# Patient Record
Sex: FEMALE | Race: WHITE | NOT HISPANIC OR LATINO | Employment: OTHER | URBAN - METROPOLITAN AREA
[De-identification: names, ages, dates, MRNs, and addresses within clinical notes are randomized per-mention and may not be internally consistent; named-entity substitution may affect disease eponyms.]

---

## 2023-02-22 LAB
ALANINE AMINOTRANSFERASE (SGPT) (U/L) IN SER/PLAS: 11 U/L (ref 7–45)
ALBUMIN (G/DL) IN SER/PLAS: 4.5 G/DL (ref 3.4–5)
ALKALINE PHOSPHATASE (U/L) IN SER/PLAS: 63 U/L (ref 33–136)
ANION GAP IN SER/PLAS: 11 MMOL/L (ref 10–20)
ASPARTATE AMINOTRANSFERASE (SGOT) (U/L) IN SER/PLAS: 11 U/L (ref 9–39)
BASOPHILS (10*3/UL) IN BLOOD BY AUTOMATED COUNT: 0.09 X10E9/L (ref 0–0.1)
BASOPHILS/100 LEUKOCYTES IN BLOOD BY AUTOMATED COUNT: 1.5 % (ref 0–2)
BILIRUBIN TOTAL (MG/DL) IN SER/PLAS: 0.5 MG/DL (ref 0–1.2)
CALCIDIOL (25 OH VITAMIN D3) (NG/ML) IN SER/PLAS: 24 NG/ML
CALCIUM (MG/DL) IN SER/PLAS: 9.6 MG/DL (ref 8.6–10.6)
CARBON DIOXIDE, TOTAL (MMOL/L) IN SER/PLAS: 32 MMOL/L (ref 21–32)
CHLORIDE (MMOL/L) IN SER/PLAS: 104 MMOL/L (ref 98–107)
CHOLESTEROL (MG/DL) IN SER/PLAS: 231 MG/DL (ref 0–199)
CHOLESTEROL IN HDL (MG/DL) IN SER/PLAS: 89.4 MG/DL
CHOLESTEROL/HDL RATIO: 2.6
CREATININE (MG/DL) IN SER/PLAS: 0.7 MG/DL (ref 0.5–1.05)
EOSINOPHILS (10*3/UL) IN BLOOD BY AUTOMATED COUNT: 0.43 X10E9/L (ref 0–0.7)
EOSINOPHILS/100 LEUKOCYTES IN BLOOD BY AUTOMATED COUNT: 6.9 % (ref 0–6)
ERYTHROCYTE DISTRIBUTION WIDTH (RATIO) BY AUTOMATED COUNT: 13.6 % (ref 11.5–14.5)
ERYTHROCYTE MEAN CORPUSCULAR HEMOGLOBIN CONCENTRATION (G/DL) BY AUTOMATED: 31.8 G/DL (ref 32–36)
ERYTHROCYTE MEAN CORPUSCULAR VOLUME (FL) BY AUTOMATED COUNT: 94 FL (ref 80–100)
ERYTHROCYTES (10*6/UL) IN BLOOD BY AUTOMATED COUNT: 4.64 X10E12/L (ref 4–5.2)
ESTIMATED AVERAGE GLUCOSE FOR HBA1C: 108 MG/DL
GFR FEMALE: >90 ML/MIN/1.73M2
GLUCOSE (MG/DL) IN SER/PLAS: 90 MG/DL (ref 74–99)
HEMATOCRIT (%) IN BLOOD BY AUTOMATED COUNT: 43.4 % (ref 36–46)
HEMOGLOBIN (G/DL) IN BLOOD: 13.8 G/DL (ref 12–16)
HEMOGLOBIN A1C/HEMOGLOBIN TOTAL IN BLOOD: 5.4 %
IMMATURE GRANULOCYTES/100 LEUKOCYTES IN BLOOD BY AUTOMATED COUNT: 0.5 % (ref 0–0.9)
LDL: 126 MG/DL (ref 0–99)
LEUKOCYTES (10*3/UL) IN BLOOD BY AUTOMATED COUNT: 6.2 X10E9/L (ref 4.4–11.3)
LYMPHOCYTES (10*3/UL) IN BLOOD BY AUTOMATED COUNT: 2.44 X10E9/L (ref 1.2–4.8)
LYMPHOCYTES/100 LEUKOCYTES IN BLOOD BY AUTOMATED COUNT: 39.4 % (ref 13–44)
MONOCYTES (10*3/UL) IN BLOOD BY AUTOMATED COUNT: 0.62 X10E9/L (ref 0.1–1)
MONOCYTES/100 LEUKOCYTES IN BLOOD BY AUTOMATED COUNT: 10 % (ref 2–10)
NEUTROPHILS (10*3/UL) IN BLOOD BY AUTOMATED COUNT: 2.58 X10E9/L (ref 1.2–7.7)
NEUTROPHILS/100 LEUKOCYTES IN BLOOD BY AUTOMATED COUNT: 41.7 % (ref 40–80)
NRBC (PER 100 WBCS) BY AUTOMATED COUNT: 0 /100 WBC (ref 0–0)
PLATELETS (10*3/UL) IN BLOOD AUTOMATED COUNT: 293 X10E9/L (ref 150–450)
POTASSIUM (MMOL/L) IN SER/PLAS: 4.3 MMOL/L (ref 3.5–5.3)
PROTEIN TOTAL: 6.8 G/DL (ref 6.4–8.2)
SODIUM (MMOL/L) IN SER/PLAS: 143 MMOL/L (ref 136–145)
THYROTROPIN (MIU/L) IN SER/PLAS BY DETECTION LIMIT <= 0.05 MIU/L: 2.64 MIU/L (ref 0.44–3.98)
TRIGLYCERIDE (MG/DL) IN SER/PLAS: 77 MG/DL (ref 0–149)
UREA NITROGEN (MG/DL) IN SER/PLAS: 18 MG/DL (ref 6–23)
VLDL: 15 MG/DL (ref 0–40)

## 2023-06-06 DIAGNOSIS — I10 ESSENTIAL (PRIMARY) HYPERTENSION: ICD-10-CM

## 2023-06-06 RX ORDER — LOSARTAN POTASSIUM AND HYDROCHLOROTHIAZIDE 12.5; 5 MG/1; MG/1
TABLET ORAL
Qty: 90 TABLET | Refills: 1 | Status: SHIPPED | OUTPATIENT
Start: 2023-06-06 | End: 2023-08-23 | Stop reason: SDUPTHER

## 2023-08-23 ENCOUNTER — OFFICE VISIT (OUTPATIENT)
Dept: PRIMARY CARE | Facility: CLINIC | Age: 68
End: 2023-08-23
Payer: MEDICARE

## 2023-08-23 ENCOUNTER — LAB (OUTPATIENT)
Dept: LAB | Facility: LAB | Age: 68
End: 2023-08-23
Payer: MEDICARE

## 2023-08-23 VITALS
OXYGEN SATURATION: 98 % | BODY MASS INDEX: 29.35 KG/M2 | DIASTOLIC BLOOD PRESSURE: 83 MMHG | HEART RATE: 73 BPM | WEIGHT: 171 LBS | SYSTOLIC BLOOD PRESSURE: 130 MMHG

## 2023-08-23 DIAGNOSIS — M25.561 CHRONIC PAIN OF RIGHT KNEE: ICD-10-CM

## 2023-08-23 DIAGNOSIS — E78.2 MIXED HYPERLIPIDEMIA: ICD-10-CM

## 2023-08-23 DIAGNOSIS — I10 ESSENTIAL (PRIMARY) HYPERTENSION: Primary | ICD-10-CM

## 2023-08-23 DIAGNOSIS — E55.9 VITAMIN D DEFICIENCY: ICD-10-CM

## 2023-08-23 DIAGNOSIS — Z12.31 ENCOUNTER FOR SCREENING MAMMOGRAM FOR BREAST CANCER: ICD-10-CM

## 2023-08-23 DIAGNOSIS — F41.1 GENERALIZED ANXIETY DISORDER: ICD-10-CM

## 2023-08-23 DIAGNOSIS — G89.29 CHRONIC PAIN OF RIGHT KNEE: ICD-10-CM

## 2023-08-23 DIAGNOSIS — K21.9 GASTROESOPHAGEAL REFLUX DISEASE, UNSPECIFIED WHETHER ESOPHAGITIS PRESENT: ICD-10-CM

## 2023-08-23 PROBLEM — E66.01 MORBID OBESITY (MULTI): Status: RESOLVED | Noted: 2023-08-23 | Resolved: 2023-08-23

## 2023-08-23 PROBLEM — F51.04 CHRONIC INSOMNIA: Status: ACTIVE | Noted: 2023-08-23

## 2023-08-23 PROBLEM — E66.01 MORBID OBESITY (MULTI): Status: ACTIVE | Noted: 2023-08-23

## 2023-08-23 PROBLEM — M17.12 PRIMARY OSTEOARTHRITIS OF LEFT KNEE: Status: ACTIVE | Noted: 2023-08-23

## 2023-08-23 PROBLEM — Z98.890 HISTORY OF NECK SURGERY: Status: ACTIVE | Noted: 2020-09-24

## 2023-08-23 PROBLEM — M25.562 LEFT KNEE PAIN: Status: ACTIVE | Noted: 2023-08-23

## 2023-08-23 PROBLEM — Z96.642 STATUS POST LEFT HIP REPLACEMENT: Status: ACTIVE | Noted: 2020-11-02

## 2023-08-23 PROBLEM — E78.5 HYPERLIPIDEMIA: Status: ACTIVE | Noted: 2023-08-23

## 2023-08-23 PROBLEM — R79.9 ABNORMAL BLOOD CHEMISTRY: Status: ACTIVE | Noted: 2023-08-23

## 2023-08-23 PROBLEM — M17.11 ARTHRITIS OF KNEE, RIGHT: Status: ACTIVE | Noted: 2023-08-23

## 2023-08-23 PROBLEM — F41.9 ANXIETY DISORDER: Status: ACTIVE | Noted: 2023-08-23

## 2023-08-23 PROBLEM — R53.83 FATIGUE: Status: ACTIVE | Noted: 2023-08-23

## 2023-08-23 PROBLEM — G47.33 OSA ON CPAP: Status: ACTIVE | Noted: 2023-08-23

## 2023-08-23 PROBLEM — G47.00 INSOMNIA: Status: ACTIVE | Noted: 2023-08-23

## 2023-08-23 LAB
CHOLESTEROL (MG/DL) IN SER/PLAS: 204 MG/DL (ref 0–199)
CHOLESTEROL IN HDL (MG/DL) IN SER/PLAS: 79.3 MG/DL
CHOLESTEROL/HDL RATIO: 2.6
LDL: 109 MG/DL (ref 0–99)
TRIGLYCERIDE (MG/DL) IN SER/PLAS: 77 MG/DL (ref 0–149)
VLDL: 15 MG/DL (ref 0–40)

## 2023-08-23 PROCEDURE — 1159F MED LIST DOCD IN RCRD: CPT | Performed by: PHYSICIAN ASSISTANT

## 2023-08-23 PROCEDURE — 1036F TOBACCO NON-USER: CPT | Performed by: PHYSICIAN ASSISTANT

## 2023-08-23 PROCEDURE — 1126F AMNT PAIN NOTED NONE PRSNT: CPT | Performed by: PHYSICIAN ASSISTANT

## 2023-08-23 PROCEDURE — 99213 OFFICE O/P EST LOW 20 MIN: CPT | Performed by: PHYSICIAN ASSISTANT

## 2023-08-23 PROCEDURE — 1160F RVW MEDS BY RX/DR IN RCRD: CPT | Performed by: PHYSICIAN ASSISTANT

## 2023-08-23 PROCEDURE — 3079F DIAST BP 80-89 MM HG: CPT | Performed by: PHYSICIAN ASSISTANT

## 2023-08-23 PROCEDURE — 36415 COLL VENOUS BLD VENIPUNCTURE: CPT

## 2023-08-23 PROCEDURE — 80061 LIPID PANEL: CPT

## 2023-08-23 PROCEDURE — 3075F SYST BP GE 130 - 139MM HG: CPT | Performed by: PHYSICIAN ASSISTANT

## 2023-08-23 RX ORDER — SIMVASTATIN 20 MG/1
1 TABLET, FILM COATED ORAL DAILY
COMMUNITY
Start: 2013-01-19 | End: 2023-08-23 | Stop reason: SDUPTHER

## 2023-08-23 RX ORDER — CALC/MAG/B COMPLEX/D3/HERB 61
15 TABLET ORAL
COMMUNITY
Start: 2020-11-02 | End: 2023-08-23 | Stop reason: ALTCHOICE

## 2023-08-23 RX ORDER — SIMVASTATIN 20 MG/1
20 TABLET, FILM COATED ORAL DAILY
Qty: 90 TABLET | Refills: 3 | Status: SHIPPED | OUTPATIENT
Start: 2023-08-23

## 2023-08-23 RX ORDER — LOSARTAN POTASSIUM AND HYDROCHLOROTHIAZIDE 12.5; 5 MG/1; MG/1
1 TABLET ORAL DAILY
Qty: 90 TABLET | Refills: 1 | Status: SHIPPED | OUTPATIENT
Start: 2023-08-23 | End: 2024-02-14 | Stop reason: SDUPTHER

## 2023-08-23 RX ORDER — METHYLPREDNISOLONE 4 MG/1
4 TABLET ORAL
COMMUNITY
Start: 2023-02-13 | End: 2023-08-23 | Stop reason: ALTCHOICE

## 2023-08-23 RX ORDER — CITALOPRAM 20 MG/1
1 TABLET, FILM COATED ORAL DAILY
COMMUNITY
Start: 2013-01-19 | End: 2023-08-23 | Stop reason: SDUPTHER

## 2023-08-23 RX ORDER — CALC/MAG/B COMPLEX/D3/HERB 61
15 TABLET ORAL
Qty: 90 CAPSULE | Refills: 3 | Status: CANCELLED | OUTPATIENT
Start: 2023-08-23

## 2023-08-23 RX ORDER — CITALOPRAM 20 MG/1
20 TABLET, FILM COATED ORAL DAILY
Qty: 90 TABLET | Refills: 3 | Status: SHIPPED | OUTPATIENT
Start: 2023-08-23

## 2023-08-23 NOTE — RESULT ENCOUNTER NOTE
Lab results are back showing mild improvement of total cholesterol and LDL.  Levels are still above goal however. I recommend switching statin medication from simvastatin to rosuvastatin.  Please let me know if she is willing and I will call it into the pharmacy

## 2023-08-23 NOTE — ASSESSMENT & PLAN NOTE
Continue simvastatin 20 mg.  Lipid panel ordered.  Follow Mediterranean-style diet and exercise as tolerated.  Total cholesterol and LDL are elevated again, commend switching to moderate intensity statin like atorvastatin 20mg

## 2023-08-23 NOTE — ASSESSMENT & PLAN NOTE
Well controlled. Continue losartan-hydrochlorothiazide 50-12.5mg. Follow a strict DASH diet and exercise as tolerated.

## 2023-08-23 NOTE — PROGRESS NOTES
Subjective   Emmanuel Israel is a 68 y.o. female who presents for follow up,.  HPI Emmanuel Israel is a 68 y.o. female presenting for a follow up. Generally doing well. No new complaints.     HTN, HLD: stable on current regimen.  Denies any headache, dizziness, chest pain, SOB/ELKINS, palpitations, LE edema.    Anxiety: compliant with citalopram 20mg. No thoughts of self/other harm or SI.     BRIANNE: compliant with CPAP use. States usually removes mask half way through the night d/t the machine or mask making loud noises. States she changes the pads often.     Right knee OA: s/p durolane INJ with orthopedics 3/20/23, she is pleased. Due to see Ortho in September. Does not have to take OTC pain meds/creams or apply ice.     Esophageal reflux: discontinued lansoprazole. Using apple cider vinegar gummies    Vitamin D deficiency: taking 5000IU daily     Health maintenance:  Immunizations:  -Flu: deferred to fall 2023  -Pneumococcal: UTD  -Shingrix: received Zostavax, obtain Shingrix from local pharmacy   -Tdap: recommended every 10 years, obtain from local pharmacy  Mammogram UTD (last 1/9/23)-ordered next 8/23/2023  Colon cancer screening UTD (last 3/22/17) - 10 yrs  DEXA UTD (last 2021) - normal   CT cardiac scoring UTD (3/2022)    Last MCR: 2/22/23 (plain MCR)    12 point ROS reviewed and negative other than as stated in HPI    /83   Pulse 73   Wt 77.6 kg (171 lb)   SpO2 98%   BMI 29.35 kg/m²   Objective   Physical Exam  Vitals reviewed.   Constitutional:       General: She is not in acute distress.     Appearance: Normal appearance. She is not ill-appearing.   HENT:      Head: Normocephalic and atraumatic.   Eyes:      General: No scleral icterus.     Extraocular Movements: Extraocular movements intact.      Conjunctiva/sclera: Conjunctivae normal.      Pupils: Pupils are equal, round, and reactive to light.   Cardiovascular:      Rate and Rhythm: Normal rate and regular rhythm.      Heart sounds: Normal  heart sounds. No murmur heard.     No friction rub. No gallop.   Pulmonary:      Effort: Pulmonary effort is normal. No respiratory distress.      Breath sounds: Normal breath sounds. No stridor. No wheezing, rhonchi or rales.   Musculoskeletal:      Cervical back: Normal range of motion.      Right lower leg: No edema.      Left lower leg: No edema.   Skin:     General: Skin is warm and dry.   Neurological:      Mental Status: She is alert and oriented to person, place, and time. Mental status is at baseline.      Cranial Nerves: No cranial nerve deficit.      Gait: Gait normal.   Psychiatric:         Mood and Affect: Mood normal.         Behavior: Behavior normal.         Assessment/Plan   Problem List Items Addressed This Visit       Acid reflux     Stable. Discontinued Prevacid. Using apple cider vinegar gummies, which have been helping.          Anxiety disorder     Stable. Continue citalopram 20mg. Report any thoughts of self/other harm or SI         Relevant Medications    citalopram (CeleXA) 20 mg tablet    Hyperlipidemia     Continue simvastatin 20 mg.  Lipid panel ordered.  Follow Mediterranean-style diet and exercise as tolerated.  Total cholesterol and LDL are elevated again, commend switching to moderate intensity statin like atorvastatin 20mg         Relevant Medications    simvastatin (Zocor) 20 mg tablet    Other Relevant Orders    Lipid panel    Right knee pain     S/p durolene gel injection 3/20/23 with Dr. Pacheco. Due for Ortho appt 9/2023, will schedule          Vitamin D deficiency     Continue vitamin D3 5000IU daily with food.          Essential (primary) hypertension - Primary     Well controlled. Continue losartan-hydrochlorothiazide 50-12.5mg. Follow a strict DASH diet and exercise as tolerated.          Relevant Medications    losartan-hydrochlorothiazide (Hyzaar) 50-12.5 mg tablet     Other Visit Diagnoses       Encounter for screening mammogram for breast cancer        Relevant Orders     BI mammo bilateral screening tomosynthesis           Follow up in 6 months for CPE or sooner as needed

## 2023-08-30 ENCOUNTER — DOCUMENTATION (OUTPATIENT)
Dept: PRIMARY CARE | Facility: CLINIC | Age: 68
End: 2023-08-30
Payer: MEDICARE

## 2023-08-30 DIAGNOSIS — F41.1 GENERALIZED ANXIETY DISORDER: ICD-10-CM

## 2023-08-30 DIAGNOSIS — I10 ESSENTIAL (PRIMARY) HYPERTENSION: ICD-10-CM

## 2023-08-30 DIAGNOSIS — E78.2 MIXED HYPERLIPIDEMIA: ICD-10-CM

## 2023-08-30 NOTE — PROGRESS NOTES
First attempt made to reach Ms. Israel at her listed contact number.  LVM w/ my name and direct contact number.  Will continue to try to reach pt to introduce myself and CCM.  Aline Franklin RN

## 2023-08-31 NOTE — PROGRESS NOTES
Discussion w/ Danita Head, PA    Danita agrees w/ this RN recommendation to patient:  there are many factors to consider such as family hx, getting enough exercise, avoiding fatty meats high in cholesterol, another medication may be more helpful w/ improving cholesterol if she is following a strict diet and getting plenty of exercise as instructed.     Spoke w/ Ms. Israel   Pt identified by name and     Informed Ms. Israel of Danita's recommendation and informed her that Danita wants her to know if she decides to switch her cholesterol medication to let us know and she will call in to her pharmacy.    Ms. Israel verbalizes understanding and will continue her current regimen, she has no other questions at this time.    Aline Franklin, RN

## 2023-09-28 ENCOUNTER — PATIENT OUTREACH (OUTPATIENT)
Dept: PRIMARY CARE | Facility: CLINIC | Age: 68
End: 2023-09-28
Payer: MEDICARE

## 2023-09-28 DIAGNOSIS — I10 ESSENTIAL (PRIMARY) HYPERTENSION: ICD-10-CM

## 2023-09-28 DIAGNOSIS — F41.1 GENERALIZED ANXIETY DISORDER: ICD-10-CM

## 2023-09-28 DIAGNOSIS — E78.2 MIXED HYPERLIPIDEMIA: ICD-10-CM

## 2023-09-28 PROCEDURE — 99490 CHRNC CARE MGMT STAFF 1ST 20: CPT | Performed by: PHYSICIAN ASSISTANT

## 2023-09-28 NOTE — PROGRESS NOTES
"Spoke w/ Ms. Israel  Pt identified by name and     Treatment Goals     LOV: 23, /83, HR 73  A1C on 23 was 5.4  NOV: 24     For high blood pressure:   Treatment goals include:  [place]/continue prudent diet and regular exercise.   Blood Pressure Treatment goals include:   BP of 120/80, with no higher than 140/85 on consistent basis.   Exercise at least 3-4 days a week for at least 30 minutes  Eat a balanced diet, rich in fruits and vegetables, low in sodium and processed foods.  If you are overweight, work toward a goal BMI of less than 25, with short-term goal of 10 pound weight loss.     For Cholesterol:   Treatment goals include:  HIGHER IN FRUITS AND VEGGIES AND WHOLE GRAIN AND LOWER IN FATTY FOODS     Think about those things which may be affecting your ability to reach those goals, and develop a plan to overcome them.     Additional resources are available upon request to help you attain goals, including dietitian.     Ms. Israel informed me that \"she is doing well, she is following her care plan, taking all of her medications as instructed, no RF needed, no questions or concerns at this time.\"    Thanked Ms. Israel for her time to speak w/ me today and informed her that, I am happy to assist if she has any questions or concerns regarding her healthcare needs.    Pt verbalizes understanding and agrees w/ plan of care.    Aline Franklin RN    "

## 2023-10-31 ENCOUNTER — PATIENT OUTREACH (OUTPATIENT)
Dept: PRIMARY CARE | Facility: CLINIC | Age: 68
End: 2023-10-31
Payer: MEDICARE

## 2023-10-31 DIAGNOSIS — F41.1 GENERALIZED ANXIETY DISORDER: ICD-10-CM

## 2023-10-31 DIAGNOSIS — E78.2 MIXED HYPERLIPIDEMIA: ICD-10-CM

## 2023-10-31 DIAGNOSIS — I10 ESSENTIAL (PRIMARY) HYPERTENSION: ICD-10-CM

## 2023-10-31 NOTE — PROGRESS NOTES
LOV: 8/23/23, /83, HR 73  A1C on 2/22/23 was 5.4  NOV: 2/14/24    Treatment Goals     For high blood pressure:   Treatment goals include:  [place]/continue prudent diet and regular exercise.   Blood Pressure Treatment goals include:   BP of 120/80, with no higher than 140/85 on consistent basis.   Exercise at least 3-4 days a week for at least 30 minutes  Eat a balanced diet, rich in fruits and vegetables, low in sodium and processed foods.  If you are overweight, work toward a goal BMI of less than 25, with short-term goal of 10 pound weight loss.     For Cholesterol:   Treatment goals include:  HIGHER IN FRUITS AND VEGGIES AND WHOLE GRAIN AND LOWER IN FATTY FOODS     Think about those things which may be affecting your ability to reach those goals, and develop a plan to overcome them.     Additional resources are available upon request to help you attain goals, including dietitian.    Aline Franklin RN

## 2023-11-09 PROBLEM — L82.1 OTHER SEBORRHEIC KERATOSIS: Status: ACTIVE | Noted: 2019-12-04

## 2023-11-09 PROBLEM — D18.01 HEMANGIOMA OF SKIN AND SUBCUTANEOUS TISSUE: Status: ACTIVE | Noted: 2019-12-04

## 2023-11-09 PROBLEM — E66.3 OVERWEIGHT WITH BODY MASS INDEX (BMI) OF 28 TO 28.9 IN ADULT: Status: ACTIVE | Noted: 2023-11-09

## 2023-11-09 PROBLEM — L81.4 OTHER MELANIN HYPERPIGMENTATION: Status: ACTIVE | Noted: 2019-12-04

## 2023-11-09 PROBLEM — D22.5 MELANOCYTIC NEVI OF TRUNK: Status: ACTIVE | Noted: 2019-12-04

## 2023-11-09 PROBLEM — D22.60 MELANOCYTIC NEVI OF UNSPECIFIED UPPER LIMB, INCLUDING SHOULDER: Status: ACTIVE | Noted: 2019-12-04

## 2023-11-09 PROBLEM — D23.9 OTHER BENIGN NEOPLASM OF SKIN, UNSPECIFIED: Status: ACTIVE | Noted: 2019-12-04

## 2023-11-10 ENCOUNTER — OFFICE VISIT (OUTPATIENT)
Dept: ORTHOPEDIC SURGERY | Facility: CLINIC | Age: 68
End: 2023-11-10
Payer: MEDICARE

## 2023-11-10 DIAGNOSIS — M17.11 ARTHRITIS OF KNEE, RIGHT: ICD-10-CM

## 2023-11-10 DIAGNOSIS — M17.0 OSTEOARTHRITIS OF BOTH KNEES, UNSPECIFIED OSTEOARTHRITIS TYPE: Primary | ICD-10-CM

## 2023-11-10 DIAGNOSIS — M17.12 PRIMARY OSTEOARTHRITIS OF LEFT KNEE: ICD-10-CM

## 2023-11-10 PROCEDURE — 99213 OFFICE O/P EST LOW 20 MIN: CPT | Performed by: EMERGENCY MEDICINE

## 2023-11-10 PROCEDURE — 1159F MED LIST DOCD IN RCRD: CPT | Performed by: EMERGENCY MEDICINE

## 2023-11-10 PROCEDURE — 1126F AMNT PAIN NOTED NONE PRSNT: CPT | Performed by: EMERGENCY MEDICINE

## 2023-11-10 PROCEDURE — 20611 DRAIN/INJ JOINT/BURSA W/US: CPT | Performed by: EMERGENCY MEDICINE

## 2023-11-10 PROCEDURE — 3079F DIAST BP 80-89 MM HG: CPT | Performed by: EMERGENCY MEDICINE

## 2023-11-10 PROCEDURE — 1160F RVW MEDS BY RX/DR IN RCRD: CPT | Performed by: EMERGENCY MEDICINE

## 2023-11-10 PROCEDURE — 3075F SYST BP GE 130 - 139MM HG: CPT | Performed by: EMERGENCY MEDICINE

## 2023-11-10 PROCEDURE — 1036F TOBACCO NON-USER: CPT | Performed by: EMERGENCY MEDICINE

## 2023-11-10 RX ORDER — LIDOCAINE HYDROCHLORIDE 10 MG/ML
4 INJECTION INFILTRATION; PERINEURAL
Status: COMPLETED | OUTPATIENT
Start: 2023-11-10 | End: 2023-11-10

## 2023-11-10 RX ORDER — TRIAMCINOLONE ACETONIDE 40 MG/ML
80 INJECTION, SUSPENSION INTRA-ARTICULAR; INTRAMUSCULAR
Status: COMPLETED | OUTPATIENT
Start: 2023-11-10 | End: 2023-11-10

## 2023-11-10 RX ADMIN — LIDOCAINE HYDROCHLORIDE 4 ML: 10 INJECTION INFILTRATION; PERINEURAL at 10:37

## 2023-11-10 RX ADMIN — TRIAMCINOLONE ACETONIDE 80 MG: 40 INJECTION, SUSPENSION INTRA-ARTICULAR; INTRAMUSCULAR at 10:37

## 2023-11-10 NOTE — PROGRESS NOTES
"Subjective   Emmanuel Israel \"Cait\" is a 68 y.o. female who presents for Follow-up and Pain of the Left Knee (DOLI: 2/6/23) and Follow-up and Pain of the Right Knee (DOLI: 3/20/23/)    HPI  11/10/23: Patient returns today for bilateral knee pain.  She has known bilateral knee DJD.  She did have a left knee corticosteroid junction performed on 2/6/2023 that worked well for her up until recently.  Therefore, she would like to have a repeat left knee corticosteroid injection.  Additionally, she has had a right knee Durolane injection on 3/20/2023 that worked well for her up until recently.  Considering this, she would like to have a right knee Durolane injection.    3/20/23: Patient returns today for her right knee pain.  Continues to have pain relief from the previous corticosteroid injection for her left knee, over her right knee pain has returned.  During her last visit on 2/6/2023, we discussed the option of longer lasting viscosupplementation.  She elected to proceed and she returns today for the injection.  She denies any recent injuries.  She has no additional complaints at this time.    2/06/2023: Returned for follow up of chronic atraumatic right knee pain status post corticosteroid injection on 9/26/2022. She reports some relief with prior injection for 1 month duration. She notes that she has been busy to return though feels the injection did help and would like to repeat. She feels brace has helped some as well. She feels her left knee has started to cause her some pain recently and would like her left knee assessed as well. She denies interval trauma or injury. Continuing APAP daily for breakthrough pain. No other concerns reported otherwise.     9/26/2022: Emmanuel is a 67 year old retired female retired  who presents as a new patient for evaluation of chronic right knee pain. She reports having right knee pain for at least 6 months duration without known trauma or injury. She states " she had a meniscectomy to the right knee over 20 years ago but otherwise denies any other knee surgeries or ever having an injection to the right knee. She reports a gradual onset of her knee pain    ROS: All pertinent positive symptoms are included in the history of present illness.    All other systems have been reviewed and are negative and noncontributory to this patient's current ailments.    Objective     There were no vitals filed for this visit.    Physical Exam  General: Patient is well appearing/well developed, pleasant and in no apparent distress  Head/Neck: Atraumatic and normocephalic; trachea midline  Eyes: EOMI   Integumentary: without rashes, erythema, abrasions/lacerations or ecchymosis  Vascular: pulses intact, no evidence of edema   Respiratory: no sings of acute respiratory distress or increased respiratory effort    Neurological: Intact sensation, normal strength, no asymmetry noted, no evidence of paraesthesia noted.  Musculoskeletal:   The RIGHT knee is with trace joint effusion without obvious signs of acute bony deformity, swelling, erythema, ecchymosis. The patella is without tenderness. Apprehension is negative with medial and lateral glide. Patella crepitus is positive. Patella grind is positive. The medial joint line is tender without bony crepitus or step-off. The lateral joint line is nontender and without bony crepitus or step-off. Flexion & extension are full and symmetrical. Varus stress test at 0° and 30° of flexion, valgus stress, Bin's positive. Anterior and posterior drawer are all negative.   The LEFT knee is with trace joint effusion without obvious signs of acute bony deformity, swelling, erythema, ecchymosis. The patella is without tenderness. Apprehension is negative with medial and lateral glide. Patella crepitus is positive. Patella grind is positive. The medial joint line is tender without bony crepitus or step-off. The lateral joint line is nontender and without  "bony crepitus or step-off. Flexion & extension are full and symmetrical. Varus stress test at 0° and 30° of flexion, valgus stress, Bin's positive. Anterior and posterior drawer are all negative.     XR KNEE COMPLETE 4 OR MORE VIEWS    - Impression -  No acute fracture or dislocation. Tricompartmental mild degenerative  changes of the left knee.    Ovoid sclerotic focus of the proximal left tibia may represent a bone  island.    Patient ID: Emmanuel Israel \"Kia" is a 68 y.o. female.    L Inj/Asp: R knee on 11/10/2023 10:37 AM  Indications: pain  Details: 21 G needle, ultrasound-guided superolateral approach  Medications: 30 mg hyaluronan 30 mg/2 mL  Outcome: tolerated well, no immediate complications  Procedure, treatment alternatives, risks and benefits explained, specific risks discussed. Consent was given by the patient. Immediately prior to procedure a time out was called to verify the correct patient, procedure, equipment, support staff and site/side marked as required. Patient was prepped and draped in the usual sterile fashion.       L Inj/Asp: L knee on 11/10/2023 10:37 AM  Indications: pain  Details: 25 G needle, ultrasound-guided superolateral approach  Medications: 80 mg triamcinolone acetonide 40 mg/mL; 4 mL lidocaine 10 mg/mL (1 %)  Outcome: tolerated well, no immediate complications  Procedure, treatment alternatives, risks and benefits explained, specific risks discussed. Consent was given by the patient. Immediately prior to procedure a time out was called to verify the correct patient, procedure, equipment, support staff and site/side marked as required. Patient was prepped and draped in the usual sterile fashion.           Assessment/Plan   Problem List Items Addressed This Visit       Arthritis of knee, right    Relevant Orders    Point of Care Ultrasound (Completed)    Primary osteoarthritis of left knee    Relevant Orders    Point of Care Ultrasound (Completed)     Other Visit " Diagnoses       Osteoarthritis of both knees, unspecified osteoarthritis type    -  Primary          Discussed risks versus benefits of having injections performed. Patient has elected to proceed with procedures. Please refer to procedure notes above. Discussed with patient to limit weightbearing activities over the next 24-48 hours, they can then progress to full activities as tolerated. Discussed with patient to avoid water submersion over the next 2 days. Discussed with patient to call me immediately if they develop worsening pain, rash, erythema, or fevers. Patient should follow-up as needed if pain persists or worsens.    Asael Pacheco, DO  Sports Medicine  [facility]     ** Please excuse any errors in grammar or translation related to this dictation. Voice recognition software was utilized to prepare this document. **

## 2023-11-30 ENCOUNTER — PATIENT OUTREACH (OUTPATIENT)
Dept: PRIMARY CARE | Facility: CLINIC | Age: 68
End: 2023-11-30
Payer: MEDICARE

## 2023-11-30 DIAGNOSIS — E78.2 MIXED HYPERLIPIDEMIA: ICD-10-CM

## 2023-11-30 DIAGNOSIS — F41.1 GENERALIZED ANXIETY DISORDER: ICD-10-CM

## 2023-11-30 DIAGNOSIS — I10 ESSENTIAL (PRIMARY) HYPERTENSION: ICD-10-CM

## 2023-11-30 NOTE — PROGRESS NOTES
Attempt made to reach Ms. Israel at her listed contact numbers for [facility] outreach.  LVM w/ my name and direct contact number.    LOV: 8/23/23, /83, HR 73  A1C on 2/22/23 was 5.4  NOV: 2/14/24     Treatment Goals     For high blood pressure:   Treatment goals include:  [place]/continue prudent diet and regular exercise.   Blood Pressure Treatment goals include:   BP of 120/80, with no higher than 140/85 on consistent basis.   Exercise at least 3-4 days a week for at least 30 minutes  Eat a balanced diet, rich in fruits and vegetables, low in sodium and processed foods.  If you are overweight, work toward a goal BMI of less than 25, with short-term goal of 10 pound weight loss.     For Cholesterol:   Treatment goals include:  HIGHER IN FRUITS AND VEGGIES AND WHOLE GRAIN AND LOWER IN FATTY FOODS     Think about those things which may be affecting your ability to reach those goals, and develop a plan to overcome them.     Additional resources are available upon request to help you attain goals, including dietitian.     Aline Franklin RN

## 2023-12-08 ENCOUNTER — OFFICE VISIT (OUTPATIENT)
Dept: DERMATOLOGY | Facility: CLINIC | Age: 68
End: 2023-12-08
Payer: MEDICARE

## 2023-12-08 DIAGNOSIS — L57.0 ACTINIC KERATOSIS: Primary | ICD-10-CM

## 2023-12-08 DIAGNOSIS — L81.4 LENTIGO: ICD-10-CM

## 2023-12-08 DIAGNOSIS — L82.1 SEBORRHEIC KERATOSIS: ICD-10-CM

## 2023-12-08 DIAGNOSIS — D18.01 ANGIOMA OF SKIN: ICD-10-CM

## 2023-12-08 DIAGNOSIS — D22.9 BENIGN NEVUS: ICD-10-CM

## 2023-12-08 DIAGNOSIS — L57.9 SKIN CHANGES DUE TO CHRONIC EXPOSURE TO NONIONIZING RADIATION: ICD-10-CM

## 2023-12-08 PROCEDURE — 1036F TOBACCO NON-USER: CPT | Performed by: NURSE PRACTITIONER

## 2023-12-08 PROCEDURE — 17000 DESTRUCT PREMALG LESION: CPT | Performed by: NURSE PRACTITIONER

## 2023-12-08 PROCEDURE — 1160F RVW MEDS BY RX/DR IN RCRD: CPT | Performed by: NURSE PRACTITIONER

## 2023-12-08 PROCEDURE — 99213 OFFICE O/P EST LOW 20 MIN: CPT | Performed by: NURSE PRACTITIONER

## 2023-12-08 PROCEDURE — 1159F MED LIST DOCD IN RCRD: CPT | Performed by: NURSE PRACTITIONER

## 2023-12-08 PROCEDURE — 1126F AMNT PAIN NOTED NONE PRSNT: CPT | Performed by: NURSE PRACTITIONER

## 2023-12-08 NOTE — PATIENT INSTRUCTIONS

## 2023-12-08 NOTE — PROGRESS NOTES
Subjective     Cait Israel is a 68 y.o. female who presents for the following: Skin Check.     Review of Systems:  No other skin or systemic complaints other than what is documented elsewhere in the note.    The following portions of the chart were reviewed this encounter and updated as appropriate:          Skin Cancer History  No skin cancer on file.      Specialty Problems          Dermatology Problems    Hemangioma of skin and subcutaneous tissue    Melanocytic nevi of trunk    Melanocytic nevi of unspecified upper limb, including shoulder    Other benign neoplasm of skin, unspecified    Other melanin hyperpigmentation    Other seborrheic keratosis        Objective   Well appearing patient in no apparent distress; mood and affect are within normal limits.    A full examination was performed including scalp, head, eyes, ears, nose, lips, neck, chest, axillae, abdomen, back, buttocks, bilateral upper extremities, bilateral lower extremities, hands, feet, fingers, toes, fingernails, and toenails. All findings within normal limits unless otherwise noted below.      Assessment/Plan   1. Actinic keratosis  Right Upper Cutaneous Lip  Thin erythematous papules with gritty scale    WHAT IS ACTINIC KERATOSIS?   - Actinic keratosis (AK) is a skin condition caused by sun damage. It causes scaly, rough, or bumpy spots on the skin.  - If left alone, AKs may turn into a skin cancer. People who burn easily or have trouble tanning are at more risk for developing AKs.   - There is no one test for AKs and diagnosis is made by clinical appearance. Treatment options include cryotherapy, therapy with lights, and various creams (e.g., topical 5-fluorocuracil, imiquimod).       To lower the chance of getting AK, you can:       ?  Stay out of the sun in the middle of the day (from 10 a.m. to 4 p.m.)       ?  Wear sunscreen - An SPF of at least 30 is best. The SPF number is on the sunscreen bottle or tube.       ?  Wear a  wide-brimmed hat, long-sleeved shirt, long pants, or long skirt outside. A baseball hat does not give much protection.        ?  Do not use tanning beds.        ?  Keep a low-fat diet, less than 21% of calories should come from fat       ?  Take Vitamin B3 (nicotinomide) 500mg twice daily.      YOUR TREATMENT PLAN  - At this time I recommend treatment with cryotherapy.  - Possible side effects of liquid nitrogen treatment reviewed including formation of blisters, crusting, tenderness, scar, and discoloration which may be permanent.  - Patient advised to return the office for re-evaluation if the treated lesion(s) do not resolve within 4-6 weeks. Patient verbalizes understanding.    Destr of lesion - Right Upper Cutaneous Lip  Complexity: simple    Destruction method: cryotherapy    Informed consent: discussed and consent obtained    Timeout:  patient name, date of birth, surgical site, and procedure verified  Lesion destroyed using liquid nitrogen: Yes    Cryotherapy cycles:  2  Outcome: patient tolerated procedure well with no complications    Post-procedure details: wound care instructions given      2. Angioma of skin  Scattered cherry-red papule(s).    A cherry hemangioma is a small macule (small, flat, smooth area) or papule (small, solid bump) formed from an overgrowth of tiny blood vessels in the skin. Cherry hemangiomas are characteristically red or purplish in color. They often first appear in middle adulthood and usually increase in number with age. Cherry hemangiomas are noncancerous (benign) and are common in adults.    The present appearance of the lesion is not worrisome but it should continue to be observed and testing/treatment may be warranted if change occurs.    3. Benign nevus  Scattered, uniform and benign-appearing, regular brown melanocytic papules and macules.    The present appearance of the lesion is not worrisome but it should continue to be observed and testing/treatment may be warranted  if change occurs.    4. Seborrheic keratosis  Stuck on verrucous, tan-brown papules and plaques.      Seborrheic keratoses are common noncancerous (benign) growths of unknown cause seen in adults due to a thickening of an area of the top skin layer. Seborrheic keratoses may appear as if they are stuck on to the skin. They have distinct borders, and they may appear as papules (small, solid bumps) or plaques (solid, raised patches that are bigger than a thumbnail). They may be the same color as your skin, or they may be pink, light brown, darker brown, or very dark brown, or sometimes may appear black.    There is no way to prevent new seborrheic keratoses from forming. Seborrheic keratoses can be removed, but removal is considered a cosmetic issue and is usually not covered by insurance.    PLAN  No treatment is needed unless there is irritation from clothing, such as itching or bleeding.  2.   Some lotions containing alpha hydroxy acids, salicylic acid, or urea may make the areas feel smoother with regular use but will not eliminate them.    5. Lentigo  Scattered tan macules in sun-exposed areas.    A solar lentigo (plural, solar lentigines), also known as a sun-induced freckle or senile lentigo, is a dark (hyperpigmented) lesion caused by natural or artificial ultraviolet (UV) light. Solar lentigines may be single or multiple. This type of lentigo is different from a simple lentigo (lentigo simplex) because it is caused by exposure to UV light. Solar lentigines are benign, but they do indicate excessive sun exposure, a risk factor for the development of skin cancer.    To prevent solar lentigines, avoid exposure to sunlight in midday (10 AM to 3 PM), wear sun-protective clothing (tightly woven clothes and hats), and apply sunscreen (SPF 30 UVA and UVB block).    The present appearance of the lesion is not worrisome but it should continue to be observed and testing/treatment may be warranted if change occurs.    6.  Skin changes due to chronic exposure to nonionizing radiation  Actinic changes in the form of freckles, lentigines and hyper/hypopigmentation     ABCDEs of melanoma and atypical moles were discussed with the patient.    Patient was instructed to perform monthly self skin examination.  We recommended that the patient have regular full skin exams given an increased risk of subsequent skin cancers.    The patient was instructed to use sun protective behaviors including use of broad spectrum sunscreens and sun protective clothing to reduce risk of skin cancers.    Warning signs of non-melanoma skin cancer discussed.        Return to clinic in 1-2 years for skin check/follow up or sooner if needed

## 2023-12-29 ENCOUNTER — PATIENT OUTREACH (OUTPATIENT)
Dept: PRIMARY CARE | Facility: CLINIC | Age: 68
End: 2023-12-29
Payer: MEDICARE

## 2023-12-29 DIAGNOSIS — E78.2 MIXED HYPERLIPIDEMIA: ICD-10-CM

## 2023-12-29 DIAGNOSIS — I10 ESSENTIAL (PRIMARY) HYPERTENSION: ICD-10-CM

## 2023-12-29 DIAGNOSIS — F41.1 GENERALIZED ANXIETY DISORDER: ICD-10-CM

## 2023-12-29 NOTE — PROGRESS NOTES
Attempt made to reach Ms. Israel at her listed contact numbers for [facility] outreach.  LVM w/ my name and direct contact number.     LOV: 8/23/23, /83, HR 73  A1C on 2/22/23 was 5.4  NOV: 2/14/24     Treatment Goals     For high blood pressure:   Treatment goals include:  [place]/continue prudent diet and regular exercise.   Blood Pressure Treatment goals include:   BP of 120/80, with no higher than 140/85 on consistent basis.   Exercise at least 3-4 days a week for at least 30 minutes  Eat a balanced diet, rich in fruits and vegetables, low in sodium and processed foods.  If you are overweight, work toward a goal BMI of less than 25, with short-term goal of 10 pound weight loss.     For Cholesterol:   Treatment goals include:  HIGHER IN FRUITS AND VEGGIES AND WHOLE GRAIN AND LOWER IN FATTY FOODS     Think about those things which may be affecting your ability to reach those goals, and develop a plan to overcome them.     Additional resources are available upon request to help you attain goals, including dietitian.     Aline Franklin RN

## 2024-01-10 NOTE — PROGRESS NOTES
Several attempts made to reach Ms. Israel at her listed contact number for [facility] outreach w/ no contact.    Will send a letter to Ms. Israel and [place] [facility] program at this time.    Aline Franklin RN

## 2024-02-14 ENCOUNTER — OFFICE VISIT (OUTPATIENT)
Dept: PRIMARY CARE | Facility: CLINIC | Age: 69
End: 2024-02-14
Payer: MEDICARE

## 2024-02-14 ENCOUNTER — LAB (OUTPATIENT)
Dept: LAB | Facility: LAB | Age: 69
End: 2024-02-14
Payer: MEDICARE

## 2024-02-14 VITALS
WEIGHT: 175 LBS | BODY MASS INDEX: 30.04 KG/M2 | OXYGEN SATURATION: 98 % | DIASTOLIC BLOOD PRESSURE: 85 MMHG | HEART RATE: 78 BPM | SYSTOLIC BLOOD PRESSURE: 125 MMHG

## 2024-02-14 DIAGNOSIS — Z00.00 MEDICARE ANNUAL WELLNESS VISIT, SUBSEQUENT: ICD-10-CM

## 2024-02-14 DIAGNOSIS — R79.9 ABNORMAL BLOOD CHEMISTRY: ICD-10-CM

## 2024-02-14 DIAGNOSIS — E55.9 VITAMIN D DEFICIENCY: ICD-10-CM

## 2024-02-14 DIAGNOSIS — E78.2 MIXED HYPERLIPIDEMIA: ICD-10-CM

## 2024-02-14 DIAGNOSIS — Z13.89 ENCOUNTER FOR SCREENING FOR OTHER DISORDER: ICD-10-CM

## 2024-02-14 DIAGNOSIS — Z11.59 NEED FOR HEPATITIS C SCREENING TEST: ICD-10-CM

## 2024-02-14 DIAGNOSIS — Z11.59 NEED FOR HEPATITIS C SCREENING TEST: Primary | ICD-10-CM

## 2024-02-14 DIAGNOSIS — I10 ESSENTIAL (PRIMARY) HYPERTENSION: ICD-10-CM

## 2024-02-14 DIAGNOSIS — H01.003 BLEPHARITIS OF EYELID OF RIGHT EYE, UNSPECIFIED EYELID, UNSPECIFIED TYPE: ICD-10-CM

## 2024-02-14 LAB
25(OH)D3 SERPL-MCNC: 43 NG/ML (ref 30–100)
ALBUMIN SERPL BCP-MCNC: 4.3 G/DL (ref 3.4–5)
ALP SERPL-CCNC: 59 U/L (ref 33–136)
ALT SERPL W P-5'-P-CCNC: 15 U/L (ref 7–45)
ANION GAP SERPL CALC-SCNC: 13 MMOL/L (ref 10–20)
AST SERPL W P-5'-P-CCNC: 13 U/L (ref 9–39)
BASOPHILS # BLD AUTO: 0.07 X10*3/UL (ref 0–0.1)
BASOPHILS NFR BLD AUTO: 1.4 %
BILIRUB SERPL-MCNC: 0.6 MG/DL (ref 0–1.2)
BUN SERPL-MCNC: 14 MG/DL (ref 6–23)
CALCIUM SERPL-MCNC: 10 MG/DL (ref 8.6–10.6)
CHLORIDE SERPL-SCNC: 104 MMOL/L (ref 98–107)
CHOLEST SERPL-MCNC: 177 MG/DL (ref 0–199)
CHOLESTEROL/HDL RATIO: 2.6
CO2 SERPL-SCNC: 26 MMOL/L (ref 21–32)
CREAT SERPL-MCNC: 0.68 MG/DL (ref 0.5–1.05)
EGFRCR SERPLBLD CKD-EPI 2021: >90 ML/MIN/1.73M*2
EOSINOPHIL # BLD AUTO: 0.13 X10*3/UL (ref 0–0.7)
EOSINOPHIL NFR BLD AUTO: 2.6 %
ERYTHROCYTE [DISTWIDTH] IN BLOOD BY AUTOMATED COUNT: 12.4 % (ref 11.5–14.5)
EST. AVERAGE GLUCOSE BLD GHB EST-MCNC: 105 MG/DL
GLUCOSE SERPL-MCNC: 91 MG/DL (ref 74–99)
HBA1C MFR BLD: 5.3 %
HCT VFR BLD AUTO: 43.5 % (ref 36–46)
HCV AB SER QL: NONREACTIVE
HDLC SERPL-MCNC: 69.3 MG/DL
HGB BLD-MCNC: 14.5 G/DL (ref 12–16)
IMM GRANULOCYTES # BLD AUTO: 0.01 X10*3/UL (ref 0–0.7)
IMM GRANULOCYTES NFR BLD AUTO: 0.2 % (ref 0–0.9)
LDLC SERPL CALC-MCNC: 87 MG/DL
LYMPHOCYTES # BLD AUTO: 1.85 X10*3/UL (ref 1.2–4.8)
LYMPHOCYTES NFR BLD AUTO: 36.8 %
MCH RBC QN AUTO: 30.1 PG (ref 26–34)
MCHC RBC AUTO-ENTMCNC: 33.3 G/DL (ref 32–36)
MCV RBC AUTO: 90 FL (ref 80–100)
MONOCYTES # BLD AUTO: 0.48 X10*3/UL (ref 0.1–1)
MONOCYTES NFR BLD AUTO: 9.5 %
NEUTROPHILS # BLD AUTO: 2.49 X10*3/UL (ref 1.2–7.7)
NEUTROPHILS NFR BLD AUTO: 49.5 %
NON HDL CHOLESTEROL: 108 MG/DL (ref 0–149)
NRBC BLD-RTO: 0 /100 WBCS (ref 0–0)
PLATELET # BLD AUTO: 285 X10*3/UL (ref 150–450)
POTASSIUM SERPL-SCNC: 4.1 MMOL/L (ref 3.5–5.3)
PROT SERPL-MCNC: 7.2 G/DL (ref 6.4–8.2)
RBC # BLD AUTO: 4.82 X10*6/UL (ref 4–5.2)
SODIUM SERPL-SCNC: 139 MMOL/L (ref 136–145)
TRIGL SERPL-MCNC: 104 MG/DL (ref 0–149)
TSH SERPL-ACNC: 2.02 MIU/L (ref 0.44–3.98)
VLDL: 21 MG/DL (ref 0–40)
WBC # BLD AUTO: 5 X10*3/UL (ref 4.4–11.3)

## 2024-02-14 PROCEDURE — 86803 HEPATITIS C AB TEST: CPT

## 2024-02-14 PROCEDURE — 80061 LIPID PANEL: CPT

## 2024-02-14 PROCEDURE — 80053 COMPREHEN METABOLIC PANEL: CPT

## 2024-02-14 PROCEDURE — 1123F ACP DISCUSS/DSCN MKR DOCD: CPT | Performed by: PHYSICIAN ASSISTANT

## 2024-02-14 PROCEDURE — 82306 VITAMIN D 25 HYDROXY: CPT

## 2024-02-14 PROCEDURE — 36415 COLL VENOUS BLD VENIPUNCTURE: CPT

## 2024-02-14 PROCEDURE — 1126F AMNT PAIN NOTED NONE PRSNT: CPT | Performed by: PHYSICIAN ASSISTANT

## 2024-02-14 PROCEDURE — 1159F MED LIST DOCD IN RCRD: CPT | Performed by: PHYSICIAN ASSISTANT

## 2024-02-14 PROCEDURE — 3074F SYST BP LT 130 MM HG: CPT | Performed by: PHYSICIAN ASSISTANT

## 2024-02-14 PROCEDURE — 84443 ASSAY THYROID STIM HORMONE: CPT

## 2024-02-14 PROCEDURE — 1170F FXNL STATUS ASSESSED: CPT | Performed by: PHYSICIAN ASSISTANT

## 2024-02-14 PROCEDURE — 3079F DIAST BP 80-89 MM HG: CPT | Performed by: PHYSICIAN ASSISTANT

## 2024-02-14 PROCEDURE — 99214 OFFICE O/P EST MOD 30 MIN: CPT | Performed by: PHYSICIAN ASSISTANT

## 2024-02-14 PROCEDURE — 83036 HEMOGLOBIN GLYCOSYLATED A1C: CPT

## 2024-02-14 PROCEDURE — 1036F TOBACCO NON-USER: CPT | Performed by: PHYSICIAN ASSISTANT

## 2024-02-14 PROCEDURE — 1033F TOBACCO NONSMOKER NOR 2NDHND: CPT | Performed by: PHYSICIAN ASSISTANT

## 2024-02-14 PROCEDURE — 1160F RVW MEDS BY RX/DR IN RCRD: CPT | Performed by: PHYSICIAN ASSISTANT

## 2024-02-14 PROCEDURE — G0439 PPPS, SUBSEQ VISIT: HCPCS | Performed by: PHYSICIAN ASSISTANT

## 2024-02-14 PROCEDURE — 85025 COMPLETE CBC W/AUTO DIFF WBC: CPT

## 2024-02-14 RX ORDER — LOSARTAN POTASSIUM AND HYDROCHLOROTHIAZIDE 12.5; 5 MG/1; MG/1
1 TABLET ORAL DAILY
Qty: 90 TABLET | Refills: 1 | Status: SHIPPED | OUTPATIENT
Start: 2024-02-14

## 2024-02-14 ASSESSMENT — PATIENT HEALTH QUESTIONNAIRE - PHQ9
2. FEELING DOWN, DEPRESSED OR HOPELESS: NOT AT ALL
SUM OF ALL RESPONSES TO PHQ9 QUESTIONS 1 AND 2: 0
1. LITTLE INTEREST OR PLEASURE IN DOING THINGS: NOT AT ALL

## 2024-02-14 ASSESSMENT — ENCOUNTER SYMPTOMS
DEPRESSION: 0
LOSS OF SENSATION IN FEET: 0
OCCASIONAL FEELINGS OF UNSTEADINESS: 0

## 2024-02-14 ASSESSMENT — ACTIVITIES OF DAILY LIVING (ADL)
DRESSING: INDEPENDENT
GROCERY_SHOPPING: INDEPENDENT
BATHING: INDEPENDENT
DOING_HOUSEWORK: INDEPENDENT
TAKING_MEDICATION: INDEPENDENT
MANAGING_FINANCES: INDEPENDENT

## 2024-02-14 NOTE — ASSESSMENT & PLAN NOTE
Continue simvastatin 20 mg.  Lipid panel ordered.  Follow Mediterranean-style diet and exercise as tolerated.

## 2024-02-14 NOTE — PROGRESS NOTES
"Subjective   Bernardine R Israel \"Cait\" is a 68 y.o. female who presents for a Medicare Annual Wellness exam. Generally doing well. Currently c/o:    Blepharitis of R eye: endorses crusting in the AM at the lid margin. No eye pain, vision changes, conjunctival injection. Has not tried anything for the crusting     HTN, HLD: stable on current regimen.  Denies any headache, dizziness, chest pain, SOB/ELKINS, palpitations, LE edema.     Anxiety: compliant with citalopram 20mg. No thoughts of self/other harm or SI.      BRIANNE: compliant with CPAP use. States usually removes mask half way through the night d/t the machine or mask making loud noises. States she changes the pads often.      Right knee OA: s/p durolane INJ with orthopedics 3/20/23, she is pleased. Due to see Ortho in September. Does not h05ave to take OTC pain meds/creams or apply ice.      Esophageal reflux: discontinued lansoprazole. Using apple cider vinegar gummies     Vitamin D deficiency: taking 5000IU daily      Health maintenance:  Immunizations:  -Flu: UTD, 2023  -Pneumococcal: UTD  -Shingrix: received Zostavax, obtain Shingrix from local pharmacy   -Tdap: UTD, last 2018  Mammogram due (last 1/9/23)-ordered 8/23/2023   Colon cancer screening UTD (last 3/22/17) - 10 yrs  DEXA UTD (last 2021) - normal   CT cardiac scoring UTD (3/2022)  Eye care: UTD, 2023. No contacts or glasses. Lasix in right eye   Dental care: UTD, 2023. Goes 3x/year  Has healthcare POA/living will      Last MCR: 2/14/24 (plain MCR)    12 point ROS reviewed and negative other than as stated in HPI    Past Medical, Surgical, and Family History reviewed and updated in chart.    Reviewed all medications by prescribing practitioner or clinical pharmacist (such as prescriptions, OTCs, herbal therapies and supplements) and documented in the medical record.    /85   Pulse 78   Wt 79.4 kg (175 lb)   SpO2 98%   BMI 30.04 kg/m²   Objective   Physical Exam  Vitals reviewed. "   Constitutional:       General: She is not in acute distress.     Appearance: Normal appearance.   HENT:      Head: Normocephalic and atraumatic.      Right Ear: Tympanic membrane, ear canal and external ear normal. There is no impacted cerumen.      Left Ear: Tympanic membrane, ear canal and external ear normal. There is no impacted cerumen.      Nose: Nose normal. No congestion or rhinorrhea.      Mouth/Throat:      Mouth: Mucous membranes are moist.      Pharynx: Oropharynx is clear. No oropharyngeal exudate or posterior oropharyngeal erythema.   Eyes:      General: No scleral icterus.        Right eye: No discharge.         Left eye: No discharge.      Extraocular Movements: Extraocular movements intact.      Conjunctiva/sclera: Conjunctivae normal.      Pupils: Pupils are equal, round, and reactive to light.   Cardiovascular:      Rate and Rhythm: Normal rate and regular rhythm.      Heart sounds: Normal heart sounds. No murmur heard.     No friction rub. No gallop.   Pulmonary:      Effort: Pulmonary effort is normal. No respiratory distress.      Breath sounds: Normal breath sounds. No stridor. No wheezing, rhonchi or rales.   Abdominal:      General: Bowel sounds are normal. There is no distension.      Palpations: Abdomen is soft. There is no mass.      Tenderness: There is no abdominal tenderness. There is no right CVA tenderness or left CVA tenderness.   Musculoskeletal:         General: Normal range of motion.      Cervical back: Normal range of motion and neck supple.      Right lower leg: No edema.      Left lower leg: No edema.   Skin:     General: Skin is warm and dry.      Findings: No rash.   Neurological:      General: No focal deficit present.      Mental Status: She is alert and oriented to person, place, and time. Mental status is at baseline.      Cranial Nerves: No cranial nerve deficit.      Gait: Gait normal.   Psychiatric:         Mood and Affect: Mood normal.         Behavior: Behavior  normal.         Assessment/Plan   Problem List Items Addressed This Visit       Abnormal blood chemistry     Hemoglobin A1c ordered          Relevant Orders    Hemoglobin A1C    Hyperlipidemia     Continue simvastatin 20 mg.  Lipid panel ordered.  Follow Mediterranean-style diet and exercise as tolerated.          Relevant Orders    Hemoglobin A1C    Lipid Panel    Vitamin D deficiency     Continue vitamin D3 5000IU daily with food. Vitamin D level ordered          Relevant Orders    Vitamin D 25-Hydroxy,Total (for eval of Vitamin D levels)    Essential (primary) hypertension     Well controlled. Continue losartan-hydrochlorothiazide 50-12.5mg. Follow a strict DASH diet and exercise as tolerated.          Relevant Medications    losartan-hydrochlorothiazide (Hyzaar) 50-12.5 mg tablet    Other Relevant Orders    CBC and Auto Differential    Comprehensive Metabolic Panel    TSH with reflex to Free T4 if abnormal    Need for hepatitis C screening test - Primary     One time hepatitis C screening ordered          Relevant Orders    Hepatitis C Antibody    Medicare annual wellness visit, subsequent     Discussed age appropriate preventive health measures. CPE labs ordered          Blepharitis of eyelid of right eye     Encouraged lid hygiene. Gently wash lids with baby shampoo          Other Visit Diagnoses       Encounter for screening for other disorder                 Follow up in 6 months with Dr. Longoria or sooner as needed

## 2024-02-15 ENCOUNTER — HOSPITAL ENCOUNTER (OUTPATIENT)
Dept: RADIOLOGY | Facility: CLINIC | Age: 69
Discharge: HOME | End: 2024-02-15
Payer: MEDICARE

## 2024-02-15 DIAGNOSIS — Z12.31 ENCOUNTER FOR SCREENING MAMMOGRAM FOR BREAST CANCER: ICD-10-CM

## 2024-02-15 PROCEDURE — 77067 SCR MAMMO BI INCL CAD: CPT | Mod: BILATERAL PROCEDURE | Performed by: STUDENT IN AN ORGANIZED HEALTH CARE EDUCATION/TRAINING PROGRAM

## 2024-02-15 PROCEDURE — 77063 BREAST TOMOSYNTHESIS BI: CPT | Mod: BILATERAL PROCEDURE | Performed by: STUDENT IN AN ORGANIZED HEALTH CARE EDUCATION/TRAINING PROGRAM

## 2024-02-15 PROCEDURE — 77067 SCR MAMMO BI INCL CAD: CPT

## 2024-02-21 NOTE — RESULT ENCOUNTER NOTE
Labs all look great! Continue a healthy lifestyle through diet and exercise. No medication changes at this time

## 2024-04-04 ENCOUNTER — LAB REQUISITION (OUTPATIENT)
Dept: LAB | Facility: HOSPITAL | Age: 69
End: 2024-04-04
Payer: MEDICARE

## 2024-04-04 DIAGNOSIS — R30.0 DYSURIA: ICD-10-CM

## 2024-04-04 DIAGNOSIS — N30.01 ACUTE CYSTITIS WITH HEMATURIA: ICD-10-CM

## 2024-04-04 PROCEDURE — 87086 URINE CULTURE/COLONY COUNT: CPT

## 2024-04-06 LAB — BACTERIA UR CULT: ABNORMAL

## 2024-05-20 NOTE — PROGRESS NOTES
"Subjective   HPI: Emmanuel Israel is a 69 y.o. female new patient who presents in office for evaluation and treatment of recurrent skin lesion of lip.     Skin lesion on right upper lip  -present for 1 week  -sore and kind of burns  -a little tingly  -staying the same -not better or worse  -not rough  -has not grown in size  -weird sensation the day before the popped up  -next day popped up  -hx of cold sores a long time ago    Skin lesion on abdomen  -present for unknown amount of time  -woke up this week with significant bruising around the area  -sometimes pruritic  -no bleeding    Dry patch on face  -noticed within the last couple of weeks  -not pruritic  -feels like a rough patch    Positive fhx of skin ca - sister had sccis  Never used sunscreen as a kid  Does use sunscreen now  Tanning bed used only once    ROS: No other skin or systemic complaints other than what is documented elsewhere in the note.    ALLERGIES: Latex and Penicillins    SOCIAL:  reports that she has never smoked. She has never used smokeless tobacco. She reports current alcohol use. She reports that she does not use drugs.    Objective   Right Abdomen (side) - Lower  Inflamed seborrheic keratoses: pink and brown \"stuck on\" verrucous scaly papule with surrounding erythema and bloody crust.    Right Anterior Mandible  -Thin erythematous papules with gritty scale      Head - Anterior (Face)  Scattered open and closed comedones    Right Upper Cutaneous Lip  Erythematous papule with overlying crusty scaling        Assessment/Plan   1. Inflamed seborrheic keratosis  Right Abdomen (side) - Lower    Clinically this appears as an irritated seborrheic keratosis however SCC cannot be ruled out without performing a bx. Since the area appears clinically inflamed I recommended shave removal at this time. Discussed wound care including applying OTC bacitracin or polysporin once daily x5 days.       Shave removal - Right Abdomen (side) - " Lower    Specimen 2 - Dermatopathology- DERM LAB  Differential Diagnosis: isk vs scc  Check Margins Yes/No?:    Comments:    Dermpath Lab: Routine Histopathology (formalin-fixed tissue)    2. Actinic keratosis  Right Anterior Mandible    Discussed precancerous nature of AK's with patient. I recommended cryotherapy for treatment today. I discussed possible side effects of cryotherapy with patient, including erythema, swelling, or blistering. Patient verbalizes understanding and opts for treatment today.    Discussed with patient that if the areas that were treated with LN2 today do not resolve within 4 weeks to please call my office for evaluation.    A total of 1 AK's were tx with cryotherapy today.    Cryotherapy, skin lesion - Right Anterior Mandible    3. Acne vulgaris  Head - Anterior (Face)    I recommended treatment with retin a 0.05% cream. Discussed retinoid treatment with patient.  I recommended the patient's start by using this medication twice a week for 2 weeks and then use for 3 times a week Monday, Wednesday, and Friday for a month.  If patient does not experience any side effects they can increase the usage to nightly as tolerated.      Topical retinoids can make your face more photosensitive meaning it can burn more easily. It is important to wear SPF 60+ daily while using this medication that is broad spectrum and water resistant.    Discussed sandwiching method and recommended the patient wash her face first, apply face lotion, apply the prescribed topical, and then apply face lotion on top again.      Discussed common side effects such as itching, burning, and dryness.  Discussed with patient that it may take 4-6 months to see an improvement in skin, however, to please continue compliance with the medication and it should help.  Patient verbalizes understanding and opts for treatment today.      Related Medications  tretinoin (Retin-A) 0.05 % cream  Apply topically to clean dry face on MWF.  Increase frequency as tolerated.    4. Cold sore  Right Upper Cutaneous Lip    Based on the history and clinical exam today I believe this patient is experiencing a cold sore.  She has a history of cold sores in the past.  I recommended a 2 g bolus dose x 1 day of Valtrex.  I reiterated the importance that if this papule does not resolve within 4 weeks the area needs biopsied.  A biopsy was not performed today as the lesion was only present at the time of visit for 1 week and based on the history is and she had prodromal symptoms the day before popped up therefore my suspicion for a skin cancer is lower on my differential.    Related Medications  valACYclovir (Valtrex) 1 gram tablet  Take 1 tablet (1,000 mg) by mouth 2 times a day for 1 day.         FOLLOW UP: 4 to 6 weeks-AK check    The patient was encouraged to contact me with any further questions or concerns.  Jamila Collado PA-C  5/31/2024

## 2024-05-21 ENCOUNTER — OFFICE VISIT (OUTPATIENT)
Dept: DERMATOLOGY | Facility: CLINIC | Age: 69
End: 2024-05-21
Payer: MEDICARE

## 2024-05-21 DIAGNOSIS — L57.0 ACTINIC KERATOSIS: ICD-10-CM

## 2024-05-21 DIAGNOSIS — L70.0 ACNE VULGARIS: ICD-10-CM

## 2024-05-21 DIAGNOSIS — B00.1 COLD SORE: ICD-10-CM

## 2024-05-21 DIAGNOSIS — L82.0 INFLAMED SEBORRHEIC KERATOSIS: Primary | ICD-10-CM

## 2024-05-21 RX ORDER — VALACYCLOVIR HYDROCHLORIDE 1 G/1
1000 TABLET, FILM COATED ORAL 2 TIMES DAILY
Qty: 2 TABLET | Refills: 0 | Status: SHIPPED | OUTPATIENT
Start: 2024-05-21 | End: 2024-05-22

## 2024-05-21 RX ORDER — TRETINOIN 0.5 MG/G
CREAM TOPICAL
Qty: 45 G | Refills: 2 | Status: SHIPPED | OUTPATIENT
Start: 2024-05-21

## 2024-05-21 NOTE — PATIENT INSTRUCTIONS
STARTING A TOPICAL RETINOID  - This is a retinoid indicated for the topical treatment of acne vulgaris and milia  - Apply a thin layer to the face once daily at night on clean and dry skin.    -Patients using retinoids may experience redness, scaling, dryness, stinging/burning and/or sunburn. Maximum severity of these reactions typically occur within the first 4 weeks of treatment, and severity decreases with continued use of the medication.   - To help prevent/minimize this:   -Start by using the cream 2x weekly for 2 weeks, then use it 3x weekly for 1 month, and increase frequency as tolerated.  - Use a moisturizer in the morning and at night.  - Use sunscreen SPF 60 or greater daily that is broad spectrum and water resistant.      [place] method for starting: I recommend doing this for the first month of treatment if you have sensitive skin.   1. Wash face   2. Apply lotion   3. Apply a thin layer of prescribed topical retinoid   4. Apply lotion again      Look for face and make up products that are oil-free and non-comedogenic.

## 2024-05-23 LAB
LABORATORY COMMENT REPORT: NORMAL
PATH REPORT.FINAL DX SPEC: NORMAL
PATH REPORT.GROSS SPEC: NORMAL
PATH REPORT.MICROSCOPIC SPEC OTHER STN: NORMAL
PATH REPORT.RELEVANT HX SPEC: NORMAL
PATH REPORT.TOTAL CANCER: NORMAL

## 2024-07-09 DIAGNOSIS — F41.1 GENERALIZED ANXIETY DISORDER: ICD-10-CM

## 2024-07-09 DIAGNOSIS — I10 ESSENTIAL (PRIMARY) HYPERTENSION: ICD-10-CM

## 2024-07-09 DIAGNOSIS — E78.2 MIXED HYPERLIPIDEMIA: ICD-10-CM

## 2024-07-16 RX ORDER — CITALOPRAM 20 MG/1
20 TABLET, FILM COATED ORAL DAILY
Qty: 30 TABLET | Refills: 0 | Status: SHIPPED | OUTPATIENT
Start: 2024-07-16 | End: 2024-08-15

## 2024-07-16 RX ORDER — SIMVASTATIN 20 MG/1
20 TABLET, FILM COATED ORAL DAILY
Qty: 30 TABLET | Refills: 0 | Status: SHIPPED | OUTPATIENT
Start: 2024-07-16 | End: 2024-08-15

## 2024-07-16 RX ORDER — LOSARTAN POTASSIUM AND HYDROCHLOROTHIAZIDE 12.5; 5 MG/1; MG/1
1 TABLET ORAL DAILY
Qty: 30 TABLET | Refills: 0 | Status: SHIPPED | OUTPATIENT
Start: 2024-07-16 | End: 2024-08-15

## 2024-07-18 ENCOUNTER — HOSPITAL ENCOUNTER (OUTPATIENT)
Dept: RADIOLOGY | Facility: EXTERNAL LOCATION | Age: 69
Discharge: HOME | End: 2024-07-18

## 2024-07-18 ENCOUNTER — OFFICE VISIT (OUTPATIENT)
Dept: ORTHOPEDIC SURGERY | Facility: HOSPITAL | Age: 69
End: 2024-07-18
Payer: MEDICARE

## 2024-07-18 DIAGNOSIS — M17.11 ARTHRITIS OF KNEE, RIGHT: Primary | ICD-10-CM

## 2024-07-18 PROCEDURE — 2500000004 HC RX 250 GENERAL PHARMACY W/ HCPCS (ALT 636 FOR OP/ED): Mod: JZ | Performed by: EMERGENCY MEDICINE

## 2024-07-18 PROCEDURE — 99214 OFFICE O/P EST MOD 30 MIN: CPT | Performed by: EMERGENCY MEDICINE

## 2024-07-18 PROCEDURE — 20611 DRAIN/INJ JOINT/BURSA W/US: CPT | Mod: RT | Performed by: EMERGENCY MEDICINE

## 2024-07-18 ASSESSMENT — ENCOUNTER SYMPTOMS: KNEE SWELLING: 1

## 2024-07-18 NOTE — PROGRESS NOTES
"Subjective   Chicopamindy Israel \"Cait\" is a 69 y.o. female who presents for Follow-up of the Right Knee (Durolane inj. Dept provided/)    Right Knee       7/18/24: Patient returns today for right knee pain.  We did perform a right knee Durolane injection for her on 11/10/2023.  She felt this worked quite well for her in regards to pain control functionality.  Her pain is since returned and become progressively worse.  Consider this, she would like of a repeat Durolane injection today.  No additional complaints.    11/10/23: Patient returns today for bilateral knee pain.  She has known bilateral knee DJD.  She did have a left knee corticosteroid junction performed on 2/6/2023 that worked well for her up until recently.  Therefore, she would like to have a repeat left knee corticosteroid injection.  Additionally, she has had a right knee Durolane injection on 3/20/2023 that worked well for her up until recently.  Considering this, she would like to have a right knee Durolane injection.    3/20/23: Patient returns today for her right knee pain.  Continues to have pain relief from the previous corticosteroid injection for her left knee, over her right knee pain has returned.  During her last visit on 2/6/2023, we discussed the option of longer lasting viscosupplementation.  She elected to proceed and she returns today for the injection.  She denies any recent injuries.  She has no additional complaints at this time.    2/06/2023: Returned for follow up of chronic atraumatic right knee pain status post corticosteroid injection on 9/26/2022. She reports some relief with prior injection for 1 month duration. She notes that she has been busy to return though feels the injection did help and would like to repeat. She feels brace has helped some as well. She feels her left knee has started to cause her some pain recently and would like her left knee assessed as well. She denies interval trauma or injury. Continuing APAP " daily for breakthrough pain. No other concerns reported otherwise.     9/26/2022: Emmanuel is a 67 year old retired female retired  who presents as a new patient for evaluation of chronic right knee pain. She reports having right knee pain for at least 6 months duration without known trauma or injury. She states she had a meniscectomy to the right knee over 20 years ago but otherwise denies any other knee surgeries or ever having an injection to the right knee. She reports a gradual onset of her knee pain    ROS: All pertinent positive symptoms are included in the history of present illness.    All other systems have been reviewed and are negative and noncontributory to this patient's current ailments.    Objective     There were no vitals filed for this visit.    Physical Exam  General: Patient is well appearing/well developed, pleasant and in no apparent distress  Head/Neck: Atraumatic and normocephalic; trachea midline  Eyes: EOMI   Integumentary: without rashes, erythema, abrasions/lacerations or ecchymosis  Vascular: pulses intact, no evidence of edema   Respiratory: no sings of acute respiratory distress or increased respiratory effort    Neurological: Intact sensation, normal strength, no asymmetry noted, no evidence of paraesthesia noted.  Musculoskeletal:   The RIGHT knee is with trace joint effusion without obvious signs of acute bony deformity, swelling, erythema, ecchymosis. The patella is without tenderness. Apprehension is negative with medial and lateral glide. Patella crepitus is positive. Patella grind is positive. The medial joint line is tender without bony crepitus or step-off. The lateral joint line is nontender and without bony crepitus or step-off. Flexion & extension are full and symmetrical. Varus stress test at 0° and 30° of flexion, valgus stress, Bin's positive. Anterior and posterior drawer are all negative.   The LEFT knee is with trace joint effusion without obvious  "signs of acute bony deformity, swelling, erythema, ecchymosis. The patella is without tenderness. Apprehension is negative with medial and lateral glide. Patella crepitus is positive. Patella grind is positive. The medial joint line is tender without bony crepitus or step-off. The lateral joint line is nontender and without bony crepitus or step-off. Flexion & extension are full and symmetrical. Varus stress test at 0° and 30° of flexion, valgus stress, Bin's positive. Anterior and posterior drawer are all negative.       Patient ID: Emmanuel Israel \"Kia" is a 69 y.o. female.    L Inj/Asp: R knee on 7/18/2024 2:07 PM  Indications: pain  Details: 21 G needle, ultrasound-guided superolateral approach  Medications: 60 mg sodium hyaluronate 60 mg/3 mL  Outcome: tolerated well, no immediate complications  Procedure, treatment alternatives, risks and benefits explained, specific risks discussed. Consent was given by the patient. Immediately prior to procedure a time out was called to verify the correct patient, procedure, equipment, support staff and site/side marked as required. Patient was prepped and draped in the usual sterile fashion.         Assessment/Plan   Problem List Items Addressed This Visit       Arthritis of knee, right    Relevant Orders    Point of Care Ultrasound (Completed)     Considering that she did well with previous Durolane injection, I do feel that a repeat injection is warranted today.  Discussed risks versus benefits of having injection performed. Patient has elected to proceed with procedure. Please refer to procedure note above. Discussed with patient to limit weightbearing activities over the next 24-48 hours, they can then progress to full activities as tolerated. Discussed with patient to avoid water submersion over the next 2 days. Discussed with patient to call me immediately if they develop worsening pain, rash, erythema, or fevers. Patient should follow-up as needed if pain " persist or worsens.      Asael Pacheco, DO  [facility]  [facility]     ** Please excuse any errors in grammar or translation related to this dictation. Voice recognition software was utilized to prepare this document. **

## 2024-07-29 DIAGNOSIS — I10 ESSENTIAL (PRIMARY) HYPERTENSION: ICD-10-CM

## 2024-07-30 RX ORDER — LOSARTAN POTASSIUM AND HYDROCHLOROTHIAZIDE 12.5; 5 MG/1; MG/1
1 TABLET ORAL DAILY
Qty: 30 TABLET | Refills: 0 | OUTPATIENT
Start: 2024-07-30 | End: 2024-08-29

## 2024-08-05 DIAGNOSIS — F41.1 GENERALIZED ANXIETY DISORDER: ICD-10-CM

## 2024-08-05 DIAGNOSIS — I10 ESSENTIAL (PRIMARY) HYPERTENSION: ICD-10-CM

## 2024-08-05 DIAGNOSIS — E78.2 MIXED HYPERLIPIDEMIA: ICD-10-CM

## 2024-08-05 RX ORDER — SIMVASTATIN 20 MG/1
20 TABLET, FILM COATED ORAL DAILY
Qty: 30 TABLET | Refills: 0 | Status: CANCELLED | OUTPATIENT
Start: 2024-08-05 | End: 2024-09-04

## 2024-08-05 RX ORDER — LOSARTAN POTASSIUM AND HYDROCHLOROTHIAZIDE 12.5; 5 MG/1; MG/1
1 TABLET ORAL DAILY
Qty: 30 TABLET | Refills: 0 | Status: CANCELLED | OUTPATIENT
Start: 2024-08-05 | End: 2024-09-04

## 2024-08-05 RX ORDER — CITALOPRAM 20 MG/1
20 TABLET, FILM COATED ORAL DAILY
Qty: 30 TABLET | Refills: 0 | Status: CANCELLED | OUTPATIENT
Start: 2024-08-05 | End: 2024-09-04

## 2024-08-06 DIAGNOSIS — E78.2 MIXED HYPERLIPIDEMIA: Primary | ICD-10-CM

## 2024-08-06 DIAGNOSIS — I10 ESSENTIAL (PRIMARY) HYPERTENSION: ICD-10-CM

## 2024-08-08 DIAGNOSIS — F41.1 GENERALIZED ANXIETY DISORDER: ICD-10-CM

## 2024-08-08 RX ORDER — CITALOPRAM 20 MG/1
20 TABLET, FILM COATED ORAL DAILY
Qty: 30 TABLET | Refills: 0 | Status: SHIPPED | OUTPATIENT
Start: 2024-08-08 | End: 2024-09-07

## 2024-08-22 ENCOUNTER — APPOINTMENT (OUTPATIENT)
Dept: PRIMARY CARE | Facility: CLINIC | Age: 69
End: 2024-08-22
Payer: MEDICARE

## 2024-08-28 NOTE — PROGRESS NOTES
"Subjective   Bernardine R Israel \"Cait\" is a 69 y.o. female who presents for  NPV TO ESTABLISH PCP CARE AND FOR MED REVIEW AND REFILLS.    HPI:      69 y.o. female who presents for  NPV TO ESTABLISH PCP CARE AND FOR MED REVIEW AND REFILLS.     EMR/[place] records reviewed.    PMHx:  HTN  Hyperlipidemia  BRIANNE on CPAP  Insomnia  Anxiety; on citalopram  GERD  Right knee pain; follows with sports medicine  Lung nodule; 2 mm on CT Heart Ca scoring 3/18/22    Healthcare Providers:  Sports medicine: Hitesh Pacheco DO   Dermatology: Jamila Collado PA-C   Prior PCP: Danita Heard [facility]    Preventive Health Services:  -Last physical/Medicare Wellness Visit: 2/14/24==> NEXT DUE 2/14/25  -last mammogram: 2/15/24 No mammographic evidence of malignancy ==> NEXT DUE 2/15/25  -last colonoscopy: UTD (last 3/22/17) - DUE IN 10 yrs ==> NEXT DUE 3/22/27  -last STI screening:  -Hep C screening: negative 2/14/24  -DEXA: 4/9/21==> NOW DUE  -CT Heart Ca scoring (3/18/22):  Radiology IMPRESSION:  1. Coronary artery calcium score of 0 *.  2. Small, 2 mm nodule in the left lower lobe. If the patient is at  low risk for primary lung cancer (i.e. No significant smoking  history), then no follow-up is necessary. If the patient is at high  risk for primary lung cancer (i.e. Significant smoking history), then  the recommendation is for optional follow-up in 12 months per  Fleischner society guidelines, as detailed below.    Immunizations:   -Childhood vaccines: completed per patient    -updated COVID spike vaccine: NOW DUE  -Flu vaccine: DUE Fall 2024  -TDAP:  NOW DUE  Immunization History   Administered Date(s) Administered    DTaP vaccine, pediatric  (INFANRIX) 09/24/2018    Flu vaccine, quadrivalent, high-dose, preservative free, age 65y+ (FLUZONE) 09/22/2020, 10/01/2021    Flu vaccine, trivalent, preservative free, HIGH-DOSE, age 65y+ (Fluzone) 09/24/2018, 09/22/2020    Flu vaccine, trivalent, preservative free, age 6 months and greater " (Fluarix/Fluzone/Flulaval) 09/05/2013    Influenza, Seasonal, Quadrivalent, Adjuvanted 10/12/2022, 10/14/2023    Influenza, Unspecified 09/22/2009, 10/20/2010, 09/15/2014    Novel influenza-H1N1-09, preservative-free 11/15/2009    Pfizer COVID-19 vaccine, Fall 2023, 12 years and older, (30mcg/0.3mL) 10/14/2023    Pfizer Gray Cap SARS-CoV-2 06/17/2022    Pfizer Purple Cap SARS-CoV-2 02/25/2021, 03/18/2021, 10/01/2021    Pneumococcal conjugate vaccine, 20-valent (PREVNAR 20) 02/22/2023    Pneumococcal polysaccharide vaccine, 23-valent, age 2 years and older (PNEUMOVAX 23) 02/23/2022    RSV, 60 Years And Older (AREXVY) 10/14/2023    Zoster, live 11/01/2015         Today Cait reports:    - doing and feeling well.     -taking all medications as prescribed with no reported adverse medication side effects    -requesting medication refills    -follows a low salt , low cholesterol diet    -follows with Dr. Pacheco for right pain and receives knee injections that are helpful      Today she has no other reported complaints, issues, or problems.  ROS is NEG for HEADACHE, NAUSEA, VOMITING, DIARRHEA, CHEST PAIN, SOB, and BLEEDING.  Review of systems (12) is negative for all systems except for any identified issues in HPI above.    Objective     /88   Pulse 75   Temp 36.7 °C (98.1 °F)   Wt 78.5 kg (173 lb)   SpO2 95%   BMI 29.70 kg/m²      Physical Examination:       GENERAL           General Appearance: well-appearing, well-developed, well-hydrated, well-nourished, no acute distress.        HEENT           NECK supple, no masses or thyromegaly, no carotid bruit.        EYES           Extraocular Movements: normal, bilateral eyes CATHIE, no conjunctival injection.        HEART           Rate and Rhythm regular rate and rhythm. Heart sounds: normal S1S2, no S3 or S4. Murmurs: none.        CHEST           Breath sounds: Clear to IPPA, RR<16 no use of accessory muscles.        ABDOMEN           General: Neg for LKKS or  masses, no scleral icterus or jaundice.        MUSCULOSKELETAL           Joints Demonstration: Neg for erythema, swelling or joint deformities. gross abnormalities no gross abnormalities.        EXTREMITIES           Lower Extremities: Neg for cyanosis, clubbing or edema.       Assessment/Plan   Problem List Items Addressed This Visit       Anxiety disorder    Relevant Medications    citalopram (CeleXA) 20 mg tablet    Hyperlipidemia    Relevant Medications    simvastatin (Zocor) 20 mg tablet    Essential (primary) hypertension - Primary    Relevant Medications    losartan-hydrochlorothiazide (Hyzaar) 100-25 mg tablet    Other Relevant Orders    Comprehensive metabolic panel    Urinalysis with Reflex Microscopic     Other Visit Diagnoses       Age related osteoporosis, unspecified pathological fracture presence        Relevant Orders    XR DEXA bone density    Immunization counseling        Medication refill        Relevant Medications    simvastatin (Zocor) 20 mg tablet    citalopram (CeleXA) 20 mg tablet    Encounter for medication review        Encounter to establish care        Lung nodule        Relevant Orders    CT chest wo IV contrast    Acne vulgaris                Establish PCP care  -labs ordered (see A/P above for details)    HTN: elevated, asymptomatic  -CMP, UA ordered  -INCREASE losartan-hydrochlorothiazide from 50-12.5 mg to 100-25 mg daily in AM  -low salt, low cholesterol diet, regularly exercise, and limit alcohol intake    Hyperlipidemia: well controlled  -Continue simvastatin 20 mg. ; refilled today  -low salt, low cholesterol diet, regularly exercise, and limit alcohol intake    Anxiety: no red flag signs/sxs  -cont celexa 20 mg daily; refilled today    Age related Osteoporosis:  -DEXA ordered        Counseling:       Medication education:         Education:  The patient is counseled regarding potential side-effects of all new medications        Understanding:  Patient expressed understanding         Adherence:  No barriers to adherence identified        Immunizations Counseling  -flu vaccine due fall 2024 and TDAP now due==> declined  -recommend updated COVID spike vaccine, shingrix, and RSV that can be obtained at local pharmacy     FOLLOW-UP: 1-2 weeks for BP check and 4 weeks to discuss and review test results      Discussed recommended plan of care with patient. Patient expressed understanding and agreement with plan of care. All of patient's questions were answered at the time. Patient had no additional questions at the time.             Latrice Cartagena MD, PhD

## 2024-08-28 NOTE — PATIENT INSTRUCTIONS
It was nice meeting you today.    Please go to [facility] lab or another [facility] lab facility to complete the lab testing that I ordered      Please call radiology to schedule  : 1) DEXA bone scan  and 2) CT chest without contrast to check lung nodule       I recommend receiving the TDAP booster that prevents tetanus and other infectious disease, and  the flu vaccine    I recommend the updated COVID vaccine, RSV, and shingles (shingrix) that can be obtained at your local pharmacy    Please schedule a follow up with me in 1-2 weeks for blood pressure check

## 2024-08-30 ENCOUNTER — LAB (OUTPATIENT)
Dept: LAB | Facility: LAB | Age: 69
End: 2024-08-30
Payer: MEDICARE

## 2024-08-30 ENCOUNTER — OFFICE VISIT (OUTPATIENT)
Dept: PRIMARY CARE | Facility: CLINIC | Age: 69
End: 2024-08-30
Payer: MEDICARE

## 2024-08-30 VITALS
OXYGEN SATURATION: 95 % | WEIGHT: 173 LBS | HEART RATE: 75 BPM | BODY MASS INDEX: 29.7 KG/M2 | TEMPERATURE: 98.1 F | DIASTOLIC BLOOD PRESSURE: 88 MMHG | SYSTOLIC BLOOD PRESSURE: 156 MMHG

## 2024-08-30 DIAGNOSIS — Z76.0 MEDICATION REFILL: ICD-10-CM

## 2024-08-30 DIAGNOSIS — E78.2 MIXED HYPERLIPIDEMIA: ICD-10-CM

## 2024-08-30 DIAGNOSIS — I10 ESSENTIAL (PRIMARY) HYPERTENSION: Primary | ICD-10-CM

## 2024-08-30 DIAGNOSIS — Z76.89 ENCOUNTER TO ESTABLISH CARE: ICD-10-CM

## 2024-08-30 DIAGNOSIS — L70.0 ACNE VULGARIS: ICD-10-CM

## 2024-08-30 DIAGNOSIS — M81.0 AGE RELATED OSTEOPOROSIS, UNSPECIFIED PATHOLOGICAL FRACTURE PRESENCE: ICD-10-CM

## 2024-08-30 DIAGNOSIS — Z71.85 IMMUNIZATION COUNSELING: ICD-10-CM

## 2024-08-30 DIAGNOSIS — R91.1 LUNG NODULE: ICD-10-CM

## 2024-08-30 DIAGNOSIS — F41.1 GENERALIZED ANXIETY DISORDER: ICD-10-CM

## 2024-08-30 DIAGNOSIS — I10 ESSENTIAL (PRIMARY) HYPERTENSION: ICD-10-CM

## 2024-08-30 DIAGNOSIS — Z79.899 ENCOUNTER FOR MEDICATION REVIEW: ICD-10-CM

## 2024-08-30 LAB
ALBUMIN SERPL BCP-MCNC: 4.5 G/DL (ref 3.4–5)
ALP SERPL-CCNC: 65 U/L (ref 33–136)
ALT SERPL W P-5'-P-CCNC: 13 U/L (ref 7–45)
ANION GAP SERPL CALC-SCNC: 12 MMOL/L (ref 10–20)
APPEARANCE UR: CLEAR
AST SERPL W P-5'-P-CCNC: 12 U/L (ref 9–39)
BILIRUB SERPL-MCNC: 0.7 MG/DL (ref 0–1.2)
BILIRUB UR STRIP.AUTO-MCNC: NEGATIVE MG/DL
BUN SERPL-MCNC: 12 MG/DL (ref 6–23)
CALCIUM SERPL-MCNC: 9.8 MG/DL (ref 8.6–10.6)
CHLORIDE SERPL-SCNC: 104 MMOL/L (ref 98–107)
CO2 SERPL-SCNC: 29 MMOL/L (ref 21–32)
COLOR UR: NORMAL
CREAT SERPL-MCNC: 0.69 MG/DL (ref 0.5–1.05)
EGFRCR SERPLBLD CKD-EPI 2021: >90 ML/MIN/1.73M*2
GLUCOSE SERPL-MCNC: 88 MG/DL (ref 74–99)
GLUCOSE UR STRIP.AUTO-MCNC: NORMAL MG/DL
KETONES UR STRIP.AUTO-MCNC: NEGATIVE MG/DL
LEUKOCYTE ESTERASE UR QL STRIP.AUTO: NEGATIVE
NITRITE UR QL STRIP.AUTO: NEGATIVE
PH UR STRIP.AUTO: 7.5 [PH]
POTASSIUM SERPL-SCNC: 4.2 MMOL/L (ref 3.5–5.3)
PROT SERPL-MCNC: 7.3 G/DL (ref 6.4–8.2)
PROT UR STRIP.AUTO-MCNC: NORMAL MG/DL
RBC # UR STRIP.AUTO: NEGATIVE /UL
RBC #/AREA URNS AUTO: NORMAL /HPF
SODIUM SERPL-SCNC: 141 MMOL/L (ref 136–145)
SP GR UR STRIP.AUTO: 1.02
UROBILINOGEN UR STRIP.AUTO-MCNC: NORMAL MG/DL
WBC #/AREA URNS AUTO: NORMAL /HPF

## 2024-08-30 PROCEDURE — 99204 OFFICE O/P NEW MOD 45 MIN: CPT | Performed by: FAMILY MEDICINE

## 2024-08-30 PROCEDURE — 81001 URINALYSIS AUTO W/SCOPE: CPT

## 2024-08-30 PROCEDURE — 1159F MED LIST DOCD IN RCRD: CPT | Performed by: FAMILY MEDICINE

## 2024-08-30 PROCEDURE — 1126F AMNT PAIN NOTED NONE PRSNT: CPT | Performed by: FAMILY MEDICINE

## 2024-08-30 PROCEDURE — 36415 COLL VENOUS BLD VENIPUNCTURE: CPT

## 2024-08-30 PROCEDURE — 99214 OFFICE O/P EST MOD 30 MIN: CPT | Performed by: FAMILY MEDICINE

## 2024-08-30 PROCEDURE — 3077F SYST BP >= 140 MM HG: CPT | Performed by: FAMILY MEDICINE

## 2024-08-30 PROCEDURE — 3079F DIAST BP 80-89 MM HG: CPT | Performed by: FAMILY MEDICINE

## 2024-08-30 PROCEDURE — 80053 COMPREHEN METABOLIC PANEL: CPT

## 2024-08-30 PROCEDURE — 1036F TOBACCO NON-USER: CPT | Performed by: FAMILY MEDICINE

## 2024-08-30 RX ORDER — LOSARTAN POTASSIUM AND HYDROCHLOROTHIAZIDE 25; 100 MG/1; MG/1
1 TABLET ORAL DAILY
Qty: 90 TABLET | Refills: 3 | Status: SHIPPED | OUTPATIENT
Start: 2024-08-30 | End: 2025-08-30

## 2024-08-30 RX ORDER — LOSARTAN POTASSIUM AND HYDROCHLOROTHIAZIDE 12.5; 5 MG/1; MG/1
1 TABLET ORAL DAILY
Qty: 90 TABLET | Refills: 3 | Status: CANCELLED | OUTPATIENT
Start: 2024-08-30 | End: 2025-08-30

## 2024-08-30 RX ORDER — SIMVASTATIN 20 MG/1
20 TABLET, FILM COATED ORAL DAILY
Qty: 90 TABLET | Refills: 3 | Status: SHIPPED | OUTPATIENT
Start: 2024-08-30 | End: 2025-08-30

## 2024-08-30 RX ORDER — CITALOPRAM 20 MG/1
20 TABLET, FILM COATED ORAL DAILY
Qty: 90 TABLET | Refills: 3 | Status: SHIPPED | OUTPATIENT
Start: 2024-08-30 | End: 2025-08-30

## 2024-08-30 ASSESSMENT — PATIENT HEALTH QUESTIONNAIRE - PHQ9
SUM OF ALL RESPONSES TO PHQ9 QUESTIONS 1 AND 2: 0
2. FEELING DOWN, DEPRESSED OR HOPELESS: NOT AT ALL
1. LITTLE INTEREST OR PLEASURE IN DOING THINGS: NOT AT ALL

## 2024-08-30 ASSESSMENT — COLUMBIA-SUICIDE SEVERITY RATING SCALE - C-SSRS
1. IN THE PAST MONTH, HAVE YOU WISHED YOU WERE DEAD OR WISHED YOU COULD GO TO SLEEP AND NOT WAKE UP?: NO
6. HAVE YOU EVER DONE ANYTHING, STARTED TO DO ANYTHING, OR PREPARED TO DO ANYTHING TO END YOUR LIFE?: NO

## 2024-08-30 ASSESSMENT — PAIN SCALES - GENERAL: PAINLEVEL: 0-NO PAIN

## 2024-09-05 ENCOUNTER — HOSPITAL ENCOUNTER (OUTPATIENT)
Dept: RADIOLOGY | Facility: CLINIC | Age: 69
Discharge: HOME | End: 2024-09-05
Payer: MEDICARE

## 2024-09-05 ENCOUNTER — HOSPITAL ENCOUNTER (OUTPATIENT)
Dept: RADIOLOGY | Facility: HOSPITAL | Age: 69
Discharge: HOME | End: 2024-09-05
Payer: MEDICARE

## 2024-09-05 DIAGNOSIS — R91.1 LUNG NODULE: ICD-10-CM

## 2024-09-05 DIAGNOSIS — M81.0 AGE RELATED OSTEOPOROSIS, UNSPECIFIED PATHOLOGICAL FRACTURE PRESENCE: ICD-10-CM

## 2024-09-05 PROCEDURE — 77080 DXA BONE DENSITY AXIAL: CPT

## 2024-09-05 PROCEDURE — 71250 CT THORAX DX C-: CPT

## 2024-09-05 ASSESSMENT — LIFESTYLE VARIABLES
3_OR_MORE_DRINKS_PER_DAY: N
CURRENT_SMOKER: N

## 2024-09-14 NOTE — PATIENT INSTRUCTIONS
It was nice seeing you today.     Please see pulmonology and orthopedic surgery as scheduled                                        I recommend receiving the TDAP booster that prevents tetanus and other infectious disease, and  the flu vaccine. You received the flu vaccine today     I recommend the updated COVID vaccine, RSV, and shingles (shingrix) that can be obtained at your local pharmacy     Please schedule a follow up with me in 4 weeks for BP check and  12 weeks

## 2024-09-14 NOTE — PROGRESS NOTES
"Subjective   Bernardine R Israel \"Cait\" is a 69 y.o. female who presents for  FOLLOW UP FOR HTN and BP CHECK.    HPI:      69 y.o. female who presents for  FOLLOW UP FOR HTN and BP CHECK.     EMR/[facility] records reviewed.    Last seen by me on 8/30/24 for  NPV TO ESTABLISH PCP CARE AND FOR MED REVIEW AND REFILLS.  At visit:    Establish PCP care  -labs ordered (see A/P above for details)     HTN: elevated, asymptomatic  -CMP, UA ordered  -INCREASE losartan-hydrochlorothiazide from 50-12.5 mg to 100-25 mg daily in AM  -low salt, low cholesterol diet, regularly exercise, and limit alcohol intake     Hyperlipidemia: well controlled  -Continue simvastatin 20 mg. ; refilled today  -low salt, low cholesterol diet, regularly exercise, and limit alcohol intake     Anxiety: no red flag signs/sxs  -cont celexa 20 mg daily; refilled today     Age related Osteoporosis:  -DEXA ordered        Immunizations Counseling  -flu vaccine due fall 2024 and TDAP now due==> declined  -recommend updated COVID spike vaccine, shingrix, and RSV that can be obtained at local pharmacy     FOLLOW-UP: 1-2 weeks for BP check and 4 weeks to discuss and review test results            PMHx:  HTN  Hyperlipidemia  BRIANNE on CPAP  Insomnia  Anxiety; on citalopram  GERD  Right knee pain; follows with sports medicine  Lung nodule; 2 mm on CT Heart Ca scoring 3/18/22     Healthcare Providers:  Sports medicine: Hitesh Pacheco,    Dermatology: Jamila Collado PA-C   Prior PCP: Danita Heard [facility]     Preventive Health Services:  -Last physical/Medicare Wellness Visit: 2/14/24==> NEXT DUE 2/14/25  -last mammogram: 2/15/24 No mammographic evidence of malignancy ==> NEXT DUE 2/15/25  -last colonoscopy: UTD (last 3/22/17) - DUE IN 10 yrs ==> NEXT DUE 3/22/27  -last STI screening:  -Hep C screening: negative 2/14/24  -DEXA: 4/9/21==> NOW DUE  -CT Heart Ca scoring (3/18/22):  Radiology IMPRESSION:  1. Coronary artery calcium score of 0 *.  2. Small, 2 mm nodule " in the left lower lobe. If the patient is at  low risk for primary lung cancer (i.e. No significant smoking  history), then no follow-up is necessary. If the patient is at high  risk for primary lung cancer (i.e. Significant smoking history), then  the recommendation is for optional follow-up in 12 months per  Fleischner society guidelines, as detailed below.     Immunizations:   -Childhood vaccines: completed per patient    -updated COVID spike vaccine: NOW DUE  -Flu vaccine: DUE NOW  -TDAP:  NOW DUE  Immunization History   Administered Date(s) Administered    DTaP vaccine, pediatric  (INFANRIX) 09/24/2018    Flu vaccine, quadrivalent, high-dose, preservative free, age 65y+ (FLUZONE) 09/22/2020, 10/01/2021    Flu vaccine, trivalent, preservative free, HIGH-DOSE, age 65y+ (Fluzone) 09/24/2018, 09/22/2020    Flu vaccine, trivalent, preservative free, age 6 months and greater (Fluarix/Fluzone/Flulaval) 09/05/2013    Influenza, Seasonal, Quadrivalent, Adjuvanted 10/12/2022, 10/14/2023    Influenza, Unspecified 09/22/2009, 10/20/2010, 09/15/2014    Novel influenza-H1N1-09, preservative-free 11/15/2009    Pfizer COVID-19 vaccine, 12 years and older, (30mcg/0.3mL) (Comirnaty) 10/14/2023    Pfizer Gray Cap SARS-CoV-2 06/17/2022    Pfizer Purple Cap SARS-CoV-2 02/25/2021, 03/18/2021, 10/01/2021    Pneumococcal conjugate vaccine, 20-valent (PREVNAR 20) 02/22/2023    Pneumococcal polysaccharide vaccine, 23-valent, age 2 years and older (PNEUMOVAX 23) 02/23/2022    RSV, 60 Years And Older (AREXVY) 10/14/2023    Zoster, live 11/01/2015     INTERVAL HISTORY:    -completed lab work ordered 8/30/24. Based on test results:  -normal liver and kidney function     -urinalysis normal      DEXA (9/5/24):  Interpreted By: Naya Fisher,   IMPRESSION:  DEXA:  According to World Health Organization criteria,  classification is normal.    CT Chest wo IV contrast (9/5/24)  Interpreted By: Hilario Akers,   Radiology IMPRESSION:  1.   Stable size of a 0.3 cm solid nodule within the left lower lobe,  likely benign. However, if patient has high risk factors for lung  cancer consider annual low-dose CT chest follow-up for further  evaluation.  2. Acute to subacute minimally displaced fracture deformities of the  left lateral 8th and 9th ribs. Otherwise, no acute cardiopulmonary  process.  3. Unchanged appearance of a small sliding type 1 hiatal hernia.  4. Cholelithiasis without evidence of cholecystitis.    ==> patient advised of referral to pulmonology ordered for evaluation of lung nodule and Will plan on repeat lung imaging in 1 year to monitor stability of lung nodule   ==> patient advised I have ordered a referral to orthopedic surgery for rib fracture and if she develop chest pain, heart palpitations, shortness of breath, or coughs up blood please call [phone]       Today Cait reports:     - doing and feeling well.      -taking all medications as prescribed with no reported adverse medication side effects     -following a low salt , low cholesterol diet    -home BP: 120s/70s    -has scheduled appts with orthopedic surgery and pulmonology        Today she has no other reported complaints, issues, or problems.    ROS is NEG for HEADACHE, NAUSEA, VOMITING, DIARRHEA, CHEST PAIN, SOB, and BLEEDING.  Review of systems (12) is negative for all systems except for any identified issues in HPI above.       Objective     /84   Pulse 81   Temp 36.8 °C (98.2 °F)   Wt 78.7 kg (173 lb 6.4 oz)   SpO2 98%   BMI 29.76 kg/m²      Physical Examination:       GENERAL           General Appearance: well-appearing, well-developed, well-hydrated, well-nourished, no acute distress.        HEENT           NECK supple, no masses or thyromegaly, no carotid bruit.        EYES           Extraocular Movements: normal, bilateral eyes CATHEI, no conjunctival injection.        HEART           Rate and Rhythm regular rate and rhythm. Heart sounds: normal S1S2, no S3 or  S4. Murmurs: none.        CHEST           Breath sounds: Clear to IPPA, RR<16 no use of accessory muscles.        ABDOMEN           General: Neg for LKKS or masses, no scleral icterus or jaundice.        MUSCULOSKELETAL           Joints Demonstration: Neg for erythema, swelling or joint deformities. gross abnormalities no gross abnormalities.        EXTREMITIES           Lower Extremities: Neg for cyanosis, clubbing or edema.       Assessment/Plan   Problem List Items Addressed This Visit       Anxiety disorder    Hyperlipidemia    Essential (primary) hypertension - Primary     Other Visit Diagnoses       Encounter for administration of vaccine        Lung nodule        Closed fracture of multiple ribs, unspecified laterality, sequela        Immunization counseling              HTN: elevated, asymptomatic. Patient reports home BP 120s/70s.  -cont losartan-hydrochlorothiazide 100-25 mg daily in AM  -if BP elevated at follow up visit will start amlodipine 5 mg daily in AM  -low salt, low cholesterol diet, regularly exercise, and limit alcohol intake     Hyperlipidemia: well controlled  -Continue simvastatin 20 mg. ; refilled today  -low salt, low cholesterol diet, regularly exercise, and limit alcohol intake     Anxiety: no red flag signs/sxs  -cont celexa 20 mg daily; refilled today     Lung nodules on CT Chest wo IV contrast 9/5/24  -referral to pulmonology previously ordered; has scheduled appt  -CT chest next due in 1 year    Acute to subacute minimally displaced fracture deformities of the left lateral 8th and 9th ribs on CT Chest wo IV contrast 9/5/24: asymptomatic. No red flag signs/sxs  -referral to orthopedic surgery previously ordered; has scheduled appt  -Emergency Dept precautions discussed and reviewed with patient      Counseling:       Medication education:         Education:  The patient is counseled regarding potential side-effects of all new medications        Understanding:  Patient expressed  understanding        Adherence:  No barriers to adherence identified        Immunizations Counseling  -flu vaccine  and TDAP now due==> received high dose flu vaccine today  -recommend updated COVID spike vaccine, shingrix, and RSV that can be obtained at local pharmacy     FOLLOW-UP: 4 weeks for BP check  and 12 weeks     Discussed recommended plan of care with patient. Patient expressed understanding and agreement with plan of care. All of patient's questions were answered at the time. Patient had no additional questions at the time.         Latrice Cartagena MD, PhD

## 2024-09-17 ENCOUNTER — OFFICE VISIT (OUTPATIENT)
Dept: PRIMARY CARE | Facility: CLINIC | Age: 69
End: 2024-09-17
Payer: MEDICARE

## 2024-09-17 VITALS
OXYGEN SATURATION: 98 % | WEIGHT: 173.4 LBS | HEART RATE: 81 BPM | BODY MASS INDEX: 29.76 KG/M2 | SYSTOLIC BLOOD PRESSURE: 140 MMHG | DIASTOLIC BLOOD PRESSURE: 84 MMHG | TEMPERATURE: 98.2 F

## 2024-09-17 DIAGNOSIS — E78.2 MIXED HYPERLIPIDEMIA: ICD-10-CM

## 2024-09-17 DIAGNOSIS — I10 ESSENTIAL (PRIMARY) HYPERTENSION: Primary | ICD-10-CM

## 2024-09-17 DIAGNOSIS — R91.1 LUNG NODULE: ICD-10-CM

## 2024-09-17 DIAGNOSIS — S22.49XS CLOSED FRACTURE OF MULTIPLE RIBS, UNSPECIFIED LATERALITY, SEQUELA: ICD-10-CM

## 2024-09-17 DIAGNOSIS — Z23 ENCOUNTER FOR ADMINISTRATION OF VACCINE: ICD-10-CM

## 2024-09-17 DIAGNOSIS — F41.1 GENERALIZED ANXIETY DISORDER: ICD-10-CM

## 2024-09-17 DIAGNOSIS — Z71.85 IMMUNIZATION COUNSELING: ICD-10-CM

## 2024-09-17 PROCEDURE — 1036F TOBACCO NON-USER: CPT | Performed by: FAMILY MEDICINE

## 2024-09-17 PROCEDURE — 90662 IIV NO PRSV INCREASED AG IM: CPT | Performed by: FAMILY MEDICINE

## 2024-09-17 PROCEDURE — 1126F AMNT PAIN NOTED NONE PRSNT: CPT | Performed by: FAMILY MEDICINE

## 2024-09-17 PROCEDURE — 3077F SYST BP >= 140 MM HG: CPT | Performed by: FAMILY MEDICINE

## 2024-09-17 PROCEDURE — G2211 COMPLEX E/M VISIT ADD ON: HCPCS | Performed by: FAMILY MEDICINE

## 2024-09-17 PROCEDURE — 1160F RVW MEDS BY RX/DR IN RCRD: CPT | Performed by: FAMILY MEDICINE

## 2024-09-17 PROCEDURE — 1159F MED LIST DOCD IN RCRD: CPT | Performed by: FAMILY MEDICINE

## 2024-09-17 PROCEDURE — 99214 OFFICE O/P EST MOD 30 MIN: CPT | Performed by: FAMILY MEDICINE

## 2024-09-17 PROCEDURE — 3079F DIAST BP 80-89 MM HG: CPT | Performed by: FAMILY MEDICINE

## 2024-09-17 ASSESSMENT — COLUMBIA-SUICIDE SEVERITY RATING SCALE - C-SSRS
1. IN THE PAST MONTH, HAVE YOU WISHED YOU WERE DEAD OR WISHED YOU COULD GO TO SLEEP AND NOT WAKE UP?: NO
6. HAVE YOU EVER DONE ANYTHING, STARTED TO DO ANYTHING, OR PREPARED TO DO ANYTHING TO END YOUR LIFE?: NO
2. HAVE YOU ACTUALLY HAD ANY THOUGHTS OF KILLING YOURSELF?: NO

## 2024-09-17 ASSESSMENT — PAIN SCALES - GENERAL: PAINLEVEL: 0-NO PAIN

## 2024-09-18 ENCOUNTER — APPOINTMENT (OUTPATIENT)
Dept: ORTHOPEDIC SURGERY | Facility: CLINIC | Age: 69
End: 2024-09-18
Payer: MEDICARE

## 2024-09-18 DIAGNOSIS — R07.81 RIB PAIN ON LEFT SIDE: Primary | ICD-10-CM

## 2024-09-18 PROCEDURE — 99213 OFFICE O/P EST LOW 20 MIN: CPT | Performed by: FAMILY MEDICINE

## 2024-09-18 PROCEDURE — 1159F MED LIST DOCD IN RCRD: CPT | Performed by: FAMILY MEDICINE

## 2024-09-18 PROCEDURE — 1036F TOBACCO NON-USER: CPT | Performed by: FAMILY MEDICINE

## 2024-09-18 NOTE — PROGRESS NOTES
** Please excuse any errors in grammar or translation related to this dictation. Voice recognition software was utilized to prepare this document. **    Assessment & Plan:    Dx: left lateral 8th and 9th rib fracture     Discussed with patient that current presentation is most aligned with the above diagnosis along with its mechanism, management, and anticipated outcome.     Discussed options for management of this condition and made shared decision making for the selected treatment listed below.      - Activity as tolerated. No specific restrictions.  - use volatren gel as needed for pain control  - advise to refrain from chiropractic adjustments over the left lateral rib fractures    Follow up as needed    All questions answered and patient is agreeable to plan of care.    Chief complaint:  Left rib fracture  HPI:  68 y/o patient, hx of hypertension, hyperlipidemia, vitamin D deficiency, presents with left 8th-9th rib fracture. No mechanism of injury reported at onset. A months ago she noted mid back pain, went to see her chiropractor who did adjustment and felt fine after. She typically has back pain so she figured it was related to that.  Fractures were incidentally found on a CT scan completed 9/5/24. She had a DEXA completed that same day. The CT scan was for lung nodules seen with 3/18/2022 CT heart calcium score. She does not have much pain now 3 out of 10. Not using anything for pain control. Denies previous surgery to this site.     Patient Active Problem List   Diagnosis    Abnormal blood chemistry    Acid reflux    Anxiety disorder    Arthritis of knee, right    Fatigue    Hyperlipidemia    Chronic insomnia    Insomnia    BRIANNE on CPAP    Primary osteoarthritis of left knee    Left knee pain    Right knee pain    Vitamin D deficiency    Essential (primary) hypertension    Chronic bilateral low back pain with bilateral sciatica    Displacement of lumbar intervertebral disc without myelopathy    History of  neck surgery    Status post left hip replacement    Other seborrheic keratosis    Other melanin hyperpigmentation    Melanocytic nevi of unspecified upper limb, including shoulder    Melanocytic nevi of trunk    Other benign neoplasm of skin, unspecified    Hemangioma of skin and subcutaneous tissue    Overweight with body mass index (BMI) of 28 to 28.9 in adult    Need for hepatitis C screening test    Medicare annual wellness visit, subsequent    Blepharitis of eyelid of right eye     Past Surgical History:   Procedure Laterality Date    BACK SURGERY  02/25/2013    Back Surgery    BREAST RECONSTRUCTION Bilateral 02/25/2013    Breast Surgery Reduction Procedure    OTHER SURGICAL HISTORY  04/01/2021    Exploratory laparoscopy    OTHER SURGICAL HISTORY  04/01/2021    Hip replacement     Current Outpatient Medications on File Prior to Visit   Medication Sig Dispense Refill    citalopram (CeleXA) 20 mg tablet Take 1 tablet (20 mg) by mouth once daily. 90 tablet 3    losartan-hydrochlorothiazide (Hyzaar) 100-25 mg tablet Take 1 tablet by mouth once daily. 90 tablet 3    simvastatin (Zocor) 20 mg tablet Take 1 tablet (20 mg) by mouth once daily. 90 tablet 3    tretinoin (Retin-A) 0.05 % cream Apply topically to clean dry face on MWF. Increase frequency as tolerated. 45 g 2    [DISCONTINUED] losartan-hydrochlorothiazide (Hyzaar) 50-12.5 mg tablet Take 1 tablet by mouth once daily. 30 tablet 0     No current facility-administered medications on file prior to visit.     Exam:    Left lateral chest  Inspection: no ecchymoses, bruising, rash, lesion  Palpation: mild pain over lateral 8th and 9th ribs. No pain over sternum, costal cartilage, xiphoid   Range of motion: no pain with rotation of back    Results:  CT of chest obtained 8/30/2024 personally interpreted as fracture of the left lateral 8th and 9th ribs.     Lab Results   Component Value Date    HGBA1C 5.3 02/14/2024    CREATININE 0.69 08/30/2024    EGFR >90 08/30/2024

## 2024-09-19 NOTE — PROGRESS NOTES
" Patient: Emmanuel Israel    [id number]  : 1955 -- AGE 69 y.o.    Provider: MARCEL Álvarez     Location [facility]   Service Date: 2024              [facility]  New Visit Note      HISTORY OF PRESENT ILLNESS     The patient's referring provider is: Latrice Cartagena MD PhD    HISTORY OF PRESENT ILLNESS   Emmanuel Israel \"Kia" is a 69 y.o. female who presents to a [facility] for an evaluation with concerns of No chief complaint on file.. I have independently interviewed and examined the patient in the office and reviewed available records.    Current History    On today's visit, the patient reports denies dyspnea on exertion or at rest. She is active . SHe is only troubled by breathlessness except on strenuous exercise (mMRC 0).   Denies orthopnea, pnd, or drea.  Weight has been mostly stable.  Denies  chronic cough, denies wheezing, and no sputum. No night cough. No hemoptysis. No fever or shivering chills. Has a runny nose, and a tingling sensation in the back of his throat. Has no runny nose, or a tingling sensation in the back of his throat. C/o heartburn. Denies chest pain    Previous pulmonary history:  no history of recurrent infections, or lung disease as a child.  No previous lung hx, never on oxygen or inhaler therapy. 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    Sleep history:  BRIANNE - could not tolerate CPAP, she closing her mouth at night    ALLERGIES AND MEDICATIONS     ALLERGIES  Allergies   Allergen Reactions    Latex Unknown and Rash    Penicillins Unknown and Rash       MEDICATIONS  Current Outpatient Medications   Medication Sig Dispense Refill    citalopram (CeleXA) 20 mg tablet Take 1 tablet (20 mg) by mouth once " daily. 90 tablet 3    losartan-hydrochlorothiazide (Hyzaar) 100-25 mg tablet Take 1 tablet by mouth once daily. 90 tablet 3    simvastatin (Zocor) 20 mg tablet Take 1 tablet (20 mg) by mouth once daily. 90 tablet 3    tretinoin (Retin-A) 0.05 % cream Apply topically to clean dry face on MWF. Increase frequency as tolerated. 45 g 2     No current facility-administered medications for this visit.         PAST HISTORY     PAST MEDICAL HISTORY  She  has no past medical history on file.  HTN  HLD    PAST SURGICAL HISTORY  Past Surgical History:   Procedure Laterality Date    BACK SURGERY  02/25/2013    Back Surgery    BREAST RECONSTRUCTION Bilateral 02/25/2013    Breast Surgery Reduction Procedure    OTHER SURGICAL HISTORY  04/01/2021    Exploratory laparoscopy    OTHER SURGICAL HISTORY  04/01/2021    Hip replacement       IMMUNIZATION HISTORY  Immunization History   Administered Date(s) Administered    DTaP vaccine, pediatric  (INFANRIX) 09/24/2018    Flu vaccine, quadrivalent, high-dose, preservative free, age 65y+ (FLUZONE) 09/22/2020, 10/01/2021    Flu vaccine, trivalent, preservative free, HIGH-DOSE, age 65y+ (Fluzone) 09/24/2018, 09/22/2020, 09/17/2024    Flu vaccine, trivalent, preservative free, age 6 months and greater (Fluarix/Fluzone/Flulaval) 09/05/2013    Influenza, Seasonal, Quadrivalent, Adjuvanted 10/12/2022, 10/14/2023    Influenza, Unspecified 09/22/2009, 10/20/2010, 09/15/2014    Novel influenza-H1N1-09, preservative-free 11/15/2009    Pfizer COVID-19 vaccine, 12 years and older, (30mcg/0.3mL) (Comirnaty) 10/14/2023    Pfizer Gray Cap SARS-CoV-2 06/17/2022    Pfizer Purple Cap SARS-CoV-2 02/25/2021, 03/18/2021, 10/01/2021    Pneumococcal conjugate vaccine, 20-valent (PREVNAR 20) 02/22/2023    Pneumococcal polysaccharide vaccine, 23-valent, age 2 years and older (PNEUMOVAX 23) 02/23/2022    RSV, 60 Years And Older (AREXVY) 10/14/2023    Zoster, live 11/01/2015       SOCIAL HISTORY  She  reports that she  has never smoked. She has never been exposed to tobacco smoke. She has never used smokeless tobacco. She reports current alcohol use. She reports that she does not use drugs. She Patient  Parents were smokers, around 2 nd hand smoke    OCCUPATIONAL/ENVIRONMENTAL HISTORY  Previously worked as: retired,    DOES/DOES NOT EC: does not have known exposure to asbestos, silica, beryllium or inhaled metals.  DOES/DOES NOT EC: does not have exposure to birds or exotic animals. Had cat    FAMILY HISTORY  Family History   Problem Relation Name Age of Onset    Cerebral aneurysm Mother      Hypertension Father      Hyperlipidemia Father      Breast cancer Sister  40 - 49    Prostate cancer Brother      Diabetes Sibling      Breast cancer Sibling      Lymphoma Other Paternal half sister     Breast cancer Other Paternal half sister      DOES/DOES NOT EC: does not have a family history of pulmonary disease. Sister had lung cancer   DOES/DOES NOT EC: does have a family history of cancer.  DOES/DOES NOT EC: does not have a family history of autoimmune disorders.    RESULTS/DATA     Pulmonary Function Test Results     No testing done    Chest Radiograph     No testing done       Chest CT Scan       CT CHEST WO IV CONTRAST;  9/5/2024       INDICATION:  Signs/Symptoms:lung nodule.      COMPARISON:  CT cardiac scoring dated 03/18/2022.      ACCESSION NUMBER(S):  [id number]      ORDERING CLINICIAN:  CASSIDY RADER      TECHNIQUE:  Helical data acquisition of the chest was obtained  without IV  contrast material.  Images were reformatted in axial, coronal, and  sagittal planes.      FINDINGS:  LUNGS AND AIRWAYS:  The trachea and central airways are patent. No endobronchial lesion.      No focal consolidation, pleural effusion, or pneumothorax. There are  scattered calcified granulomas within the bilateral lungs.      Stable size of a 0.3 cm solid nodule within the left lower lobe  (series 205, image 108). No new or enlarging  discrete suspicious  pulmonary nodules.      MEDIASTINUM AND ROGELIO, LOWER NECK AND AXILLA:  The visualized thyroid gland is within normal limits.      No evidence of thoracic lymphadenopathy by CT criteria. Calcified  mediastinal lymph node likely reflects sequela of prior granulomatous  infection.      Unchanged appearance of a small sliding type 1 hiatal hernia. The  esophagus is otherwise unremarkable in appearance.      HEART AND VESSELS:  The thoracic aorta is of normal course and caliber with mild vascular  calcifications. 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      Main pulmonary artery and its branches are normal in caliber.      No coronary artery calcifications are seen. The study is not  optimized for evaluation of coronary arteries.      The cardiac chambers are not enlarged.      No evidence of pericardial effusion.      UPPER ABDOMEN:  There is cholelithiasis without evidence of cholecystitis. There is a  5.6 cm simple fluid attenuating cysts with a thin internal calcified  septation seen in the left superior renal pole. Otherwise, the  partially visualized subdiaphragmatic structures are unremarkable in  appearance.      CHEST WALL AND OSSEOUS STRUCTURES:  The chest wall soft tissues are unremarkable. Acute to subacute  minimally displaced fracture deformities of the left lateral 8th and  9th ribs are noted. Mild multilevel discogenic degenerative changes  are noted throughout the thoracic spine with scattered intervertebral  disc space narrowing and vertebral body osteophytosis.      IMPRESSION:  1.  Stable size of a 0.3 cm solid nodule within the left lower lobe,  likely benign. However, if patient has high risk factors for lung  cancer consider annual low-dose CT chest follow-up for further  evaluation.  2. Acute to subacute minimally displaced fracture deformities of the  left lateral 8th and 9th ribs. Otherwise, no acute cardiopulmonary  process.  3. Unchanged appearance  "of a small sliding type 1 hiatal hernia.  4. Cholelithiasis without evidence of cholecystitis.          Echocardiogram     No testing done          REVIEW OF SYSTEMS     REVIEW OF SYSTEMS  Review of Systems   Musculoskeletal:  Positive for arthralgias and back pain.   All other systems reviewed and are negative.        PHYSICAL EXAM     VITAL SIGNS: There were no vitals taken for this visit. /79   Pulse 79   Temp 36.6 °C (97.9 °F) (Temporal)   Ht 1.626 m (5' 4\")   Wt 78 kg (172 lb)   SpO2 98%   BMI 29.52 kg/m²      CURRENT WEIGHT: [unfilled]  BMI: [unfilled]  PREVIOUS WEIGHTS:  Wt Readings from Last 3 Encounters:   09/17/24 78.7 kg (173 lb 6.4 oz)   08/30/24 78.5 kg (173 lb)   04/04/24 77.1 kg (169 lb 15.6 oz)       Physical Exam  Constitutional:       Appearance: Normal appearance.   HENT:      Head: Normocephalic and atraumatic.      Right Ear: External ear normal.      Left Ear: External ear normal.      Nose: Nose normal.      Mouth/Throat:      Mouth: Mucous membranes are moist.      Pharynx: Oropharynx is clear.   Eyes:      Extraocular Movements: Extraocular movements intact.      Conjunctiva/sclera: Conjunctivae normal.      Pupils: Pupils are equal, round, and reactive to light.   Cardiovascular:      Rate and Rhythm: Normal rate and regular rhythm.      Pulses: Normal pulses.      Heart sounds: Normal heart sounds.   Pulmonary:      Effort: Pulmonary effort is normal.      Breath sounds: Normal breath sounds.   Abdominal:      General: Bowel sounds are normal.      Palpations: Abdomen is soft.   Musculoskeletal:         General: Normal range of motion.      Cervical back: Normal range of motion and neck supple.   Skin:     General: Skin is warm and dry.   Neurological:      General: No focal deficit present.      Mental Status: She is alert and oriented to person, place, and time. Mental status is at baseline.   Psychiatric:         Mood and Affect: Mood normal.         Behavior: Behavior normal.    "      Thought Content: Thought content normal.         Judgment: Judgment normal.         ASSESSMENT/PLAN     Ms. Israel is a 69 y.o. female and  has no past medical history on file. She was referred to the [facility] for evaluation of pulmonary nodule     Problem List and Orders      Assessment and Plan / Recommendations:  Problem List Items Addressed This Visit    None  Visit Diagnoses       Lung nodule                  Pulmonary nodule  - CT cardiac scoring scattered calcified granulomas within the bilateral lungs. Had a CT chest 9/2024 - saw size 0.3mm solid nodule in LLL, repeat CT c0hest in 12 months   0  Thank you for visiting the Pulmonary clinic today!       Return to clinic after 1year  CT scan complete  or sooner if needed   Catina Denise CNP  My office number is [phone] -     Call to schedule  for radiology - CT scans/PFTs etc at  [phone]  General scheduling  [phone]     Best way to get a hold of me is to call my office --> Please do not send me follow my health messages  Any test results will be discussed at next visit -- please make sure to make a follow up appt after testing.

## 2024-09-26 ENCOUNTER — APPOINTMENT (OUTPATIENT)
Dept: PULMONOLOGY | Facility: CLINIC | Age: 69
End: 2024-09-26
Payer: MEDICARE

## 2024-09-26 VITALS
TEMPERATURE: 97.9 F | SYSTOLIC BLOOD PRESSURE: 125 MMHG | DIASTOLIC BLOOD PRESSURE: 79 MMHG | OXYGEN SATURATION: 98 % | HEART RATE: 79 BPM | WEIGHT: 172 LBS | HEIGHT: 64 IN | BODY MASS INDEX: 29.37 KG/M2

## 2024-09-26 DIAGNOSIS — R91.1 LUNG NODULE: ICD-10-CM

## 2024-09-26 PROCEDURE — 1126F AMNT PAIN NOTED NONE PRSNT: CPT | Performed by: NURSE PRACTITIONER

## 2024-09-26 PROCEDURE — 1159F MED LIST DOCD IN RCRD: CPT | Performed by: NURSE PRACTITIONER

## 2024-09-26 PROCEDURE — 3074F SYST BP LT 130 MM HG: CPT | Performed by: NURSE PRACTITIONER

## 2024-09-26 PROCEDURE — 99204 OFFICE O/P NEW MOD 45 MIN: CPT | Performed by: NURSE PRACTITIONER

## 2024-09-26 PROCEDURE — 3078F DIAST BP <80 MM HG: CPT | Performed by: NURSE PRACTITIONER

## 2024-09-26 PROCEDURE — 3008F BODY MASS INDEX DOCD: CPT | Performed by: NURSE PRACTITIONER

## 2024-09-26 PROCEDURE — 1036F TOBACCO NON-USER: CPT | Performed by: NURSE PRACTITIONER

## 2024-09-26 ASSESSMENT — ENCOUNTER SYMPTOMS
DEPRESSION: 0
BACK PAIN: 1
ARTHRALGIAS: 1
OCCASIONAL FEELINGS OF UNSTEADINESS: 0
LOSS OF SENSATION IN FEET: 0

## 2024-09-26 ASSESSMENT — PAIN SCALES - GENERAL: PAINLEVEL: 0-NO PAIN

## 2024-09-26 NOTE — PATIENT INSTRUCTIONS
Pulmonary nodule  - CT cardiac scoring scattered calcified granulomas within the bilateral lungs. Had a CT chest 9/2024 - saw size 0.3mm solid nodule in LLL, repeat CT chest in 12 months           Thank you for visiting the Pulmonary clinic today!       Return to clinic after 1year  CT scan complete  or sooner if needed   Catina Denise CNP  My office number is [phone] -     Call to schedule  for radiology - CT scans/PFTs etc at  [phone]  General scheduling  [phone]     Best way to get a hold of me is to call my office --> Please do not send me follow my health messages  Any test results will be discussed at next visit -- please make sure to make a follow up appt after testing.

## 2024-09-27 ENCOUNTER — PATIENT OUTREACH (OUTPATIENT)
Dept: PRIMARY CARE | Facility: CLINIC | Age: 69
End: 2024-09-27
Payer: MEDICARE

## 2024-09-27 ENCOUNTER — DOCUMENTATION (OUTPATIENT)
Dept: PRIMARY CARE | Facility: CLINIC | Age: 69
End: 2024-09-27
Payer: MEDICARE

## 2024-09-27 NOTE — PROGRESS NOTES
Identified eligible for Chronic Care Management program for HTN and HLD.  Left voice message requesting call back if interested.

## 2024-10-02 ENCOUNTER — PATIENT OUTREACH (OUTPATIENT)
Dept: PRIMARY CARE | Facility: CLINIC | Age: 69
End: 2024-10-02
Payer: MEDICARE

## 2024-10-02 DIAGNOSIS — I10 BENIGN HYPERTENSION: ICD-10-CM

## 2024-10-02 DIAGNOSIS — E78.2 MIXED HYPERLIPIDEMIA: ICD-10-CM

## 2024-10-02 NOTE — PROGRESS NOTES
Patient introduced to Chronic Care Management Services  Patient opted into services on: 10/2/2024  Services will be performed under the direction of PCP: Dr. Cartagena  Patient has planned AWV/Follow up on: 10/31/2024  POC will be derived from provider's comprehensive assessment and outlined plan of care found in AWV or other follow up.      Outreach to patient for CCM program due to recent change in antihypertensive medications.  She acknowledges dose increase and that she has seen improvement of her home BP to average 120/70's.  She states in-office BP typically higher but also done manually so may be more accurate than her automated cuff at home.  Discussed dietary considerations such as DASH diet (American Heart Association's Dietary Approach to Stop Hypertension) as well as low cholesterol foods to help with blood cholesterol.  She noted increase in levels when taking Zocor in AM but by taking at night it has improved her results.  She is active walking 3-5 miles a day.  Follows chiropractor for back pain, idiopathic rib fracture is healing slowly and DEXA scan does not show any osteoporosis.  Pulmonology not concerned with lung nodules in non-smoking patient and will follow up in 1 year.  Patient lives alone and able to maintain own ADL's.  Next follow up call scheduled for 11/1/2024 but CM is available before then if needed.

## 2024-10-03 ENCOUNTER — APPOINTMENT (OUTPATIENT)
Dept: PRIMARY CARE | Facility: CLINIC | Age: 69
End: 2024-10-03
Payer: MEDICARE

## 2024-10-31 ENCOUNTER — OFFICE VISIT (OUTPATIENT)
Dept: PRIMARY CARE | Facility: CLINIC | Age: 69
End: 2024-10-31
Payer: MEDICARE

## 2024-10-31 VITALS
TEMPERATURE: 97.9 F | WEIGHT: 171.2 LBS | BODY MASS INDEX: 29.39 KG/M2 | OXYGEN SATURATION: 99 % | HEART RATE: 75 BPM | DIASTOLIC BLOOD PRESSURE: 92 MMHG | SYSTOLIC BLOOD PRESSURE: 130 MMHG

## 2024-10-31 DIAGNOSIS — Z71.85 IMMUNIZATION COUNSELING: ICD-10-CM

## 2024-10-31 DIAGNOSIS — F41.1 GENERALIZED ANXIETY DISORDER: ICD-10-CM

## 2024-10-31 DIAGNOSIS — S22.49XS CLOSED FRACTURE OF MULTIPLE RIBS, UNSPECIFIED LATERALITY, SEQUELA: ICD-10-CM

## 2024-10-31 DIAGNOSIS — E78.2 MIXED HYPERLIPIDEMIA: ICD-10-CM

## 2024-10-31 DIAGNOSIS — R91.1 LUNG NODULE: ICD-10-CM

## 2024-10-31 DIAGNOSIS — I10 BENIGN HYPERTENSION: Primary | ICD-10-CM

## 2024-10-31 PROCEDURE — 3075F SYST BP GE 130 - 139MM HG: CPT | Performed by: FAMILY MEDICINE

## 2024-10-31 PROCEDURE — 1160F RVW MEDS BY RX/DR IN RCRD: CPT | Performed by: FAMILY MEDICINE

## 2024-10-31 PROCEDURE — 99214 OFFICE O/P EST MOD 30 MIN: CPT | Performed by: FAMILY MEDICINE

## 2024-10-31 PROCEDURE — 1036F TOBACCO NON-USER: CPT | Performed by: FAMILY MEDICINE

## 2024-10-31 PROCEDURE — G2211 COMPLEX E/M VISIT ADD ON: HCPCS | Performed by: FAMILY MEDICINE

## 2024-10-31 PROCEDURE — 3080F DIAST BP >= 90 MM HG: CPT | Performed by: FAMILY MEDICINE

## 2024-10-31 PROCEDURE — 1159F MED LIST DOCD IN RCRD: CPT | Performed by: FAMILY MEDICINE

## 2024-10-31 PROCEDURE — 1126F AMNT PAIN NOTED NONE PRSNT: CPT | Performed by: FAMILY MEDICINE

## 2024-10-31 ASSESSMENT — COLUMBIA-SUICIDE SEVERITY RATING SCALE - C-SSRS
1. IN THE PAST MONTH, HAVE YOU WISHED YOU WERE DEAD OR WISHED YOU COULD GO TO SLEEP AND NOT WAKE UP?: NO
2. HAVE YOU ACTUALLY HAD ANY THOUGHTS OF KILLING YOURSELF?: NO
6. HAVE YOU EVER DONE ANYTHING, STARTED TO DO ANYTHING, OR PREPARED TO DO ANYTHING TO END YOUR LIFE?: NO

## 2024-10-31 ASSESSMENT — PATIENT HEALTH QUESTIONNAIRE - PHQ9
1. LITTLE INTEREST OR PLEASURE IN DOING THINGS: NOT AT ALL
SUM OF ALL RESPONSES TO PHQ9 QUESTIONS 1 AND 2: 0
2. FEELING DOWN, DEPRESSED OR HOPELESS: NOT AT ALL

## 2024-10-31 ASSESSMENT — PAIN SCALES - GENERAL: PAINLEVEL_OUTOF10: 0-NO PAIN

## 2024-11-01 ENCOUNTER — PATIENT OUTREACH (OUTPATIENT)
Dept: PRIMARY CARE | Facility: CLINIC | Age: 69
End: 2024-11-01
Payer: MEDICARE

## 2024-11-01 DIAGNOSIS — E78.2 MIXED HYPERLIPIDEMIA: ICD-10-CM

## 2024-11-01 DIAGNOSIS — I10 BENIGN HYPERTENSION: ICD-10-CM

## 2024-11-04 ENCOUNTER — PATIENT OUTREACH (OUTPATIENT)
Dept: PRIMARY CARE | Facility: CLINIC | Age: 69
End: 2024-11-04
Payer: MEDICARE

## 2024-11-04 DIAGNOSIS — E78.2 MIXED HYPERLIPIDEMIA: ICD-10-CM

## 2024-11-04 DIAGNOSIS — I10 BENIGN HYPERTENSION: ICD-10-CM

## 2024-11-04 NOTE — PROGRESS NOTES
Outreach to patient finds her doing well without complaints today.  No questions from recent office visit with PCP.  BP at that time 130/92 which she states was unusually high for her.  At home, typically runs 128-130/72-80, never 90 diastolic.  Able do resume usual activities despite nontraumatic rib fractures.  Was prescribed Voltaren gel and Solonpas but she never purchased as she didn't feel was necessary.  No concerns or questions about hypertension or diet posed this call.

## 2024-11-19 ENCOUNTER — APPOINTMENT (OUTPATIENT)
Dept: PRIMARY CARE | Facility: CLINIC | Age: 69
End: 2024-11-19
Payer: MEDICARE

## 2024-12-04 ENCOUNTER — PATIENT OUTREACH (OUTPATIENT)
Dept: PRIMARY CARE | Facility: CLINIC | Age: 69
End: 2024-12-04
Payer: MEDICARE

## 2024-12-04 DIAGNOSIS — I10 BENIGN HYPERTENSION: ICD-10-CM

## 2024-12-04 DIAGNOSIS — E78.2 MIXED HYPERLIPIDEMIA: ICD-10-CM

## 2024-12-04 NOTE — PROGRESS NOTES
Monthly outreach finds patient without complaints.  She reports BP averaging 120-130/70-80 on Hyzaar and Amlodipine.  She is having trouble with intermittent dizziness lately and sent message to PCP which hasn't been read yet.  She feels she is hydrating self and hasn't taken any other new medications.  Doubt symptoms related to any low BP considering range she provided to me.  Could be inner ear disturbance or even Covid/viral illness.  Recommended continuing to watch fluids to prevent dehydration, eat diet according to DASH guidelines and monitor BP for any changes either higher or lower than reported.  Next office visit January 30th but may need seen sooner for new complaint

## 2024-12-10 ENCOUNTER — APPOINTMENT (OUTPATIENT)
Dept: PRIMARY CARE | Facility: CLINIC | Age: 69
End: 2024-12-10
Payer: MEDICARE

## 2025-01-06 ENCOUNTER — PATIENT OUTREACH (OUTPATIENT)
Dept: PRIMARY CARE | Facility: CLINIC | Age: 70
End: 2025-01-06
Payer: MEDICARE

## 2025-01-06 DIAGNOSIS — E78.2 MIXED HYPERLIPIDEMIA: ICD-10-CM

## 2025-01-06 DIAGNOSIS — I10 BENIGN HYPERTENSION: ICD-10-CM

## 2025-01-06 NOTE — PROGRESS NOTES
First outreach for January attempted.  Left voice message with my contact information for patient to return call

## 2025-01-08 ENCOUNTER — PATIENT OUTREACH (OUTPATIENT)
Dept: PRIMARY CARE | Facility: CLINIC | Age: 70
End: 2025-01-08
Payer: MEDICARE

## 2025-01-08 DIAGNOSIS — I10 BENIGN HYPERTENSION: ICD-10-CM

## 2025-01-08 DIAGNOSIS — E78.2 MIXED HYPERLIPIDEMIA: ICD-10-CM

## 2025-01-24 ENCOUNTER — PATIENT OUTREACH (OUTPATIENT)
Dept: PRIMARY CARE | Facility: CLINIC | Age: 70
End: 2025-01-24
Payer: MEDICARE

## 2025-01-24 DIAGNOSIS — E78.2 MIXED HYPERLIPIDEMIA: ICD-10-CM

## 2025-01-24 DIAGNOSIS — I10 BENIGN HYPERTENSION: ICD-10-CM

## 2025-01-24 NOTE — PROGRESS NOTES
Outreach call successful, finds patient feeling well but with continued feeling of unsteadiness at times.  She is not checking BP at home.  Discussed safety precautions to prevent falls.  Patient made aware of changes that CCM program will be monthly charge to her insurance as of this month but expect no charges to her with secondary payor in place.  She declines and requests removal from program.  Patient discharged at her request and no charges for January applied.

## 2025-01-28 ENCOUNTER — APPOINTMENT (OUTPATIENT)
Dept: RADIOLOGY | Facility: HOSPITAL | Age: 70
End: 2025-01-28
Payer: MEDICARE

## 2025-01-28 ENCOUNTER — OFFICE VISIT (OUTPATIENT)
Dept: URGENT CARE | Age: 70
End: 2025-01-28
Payer: MEDICARE

## 2025-01-28 ENCOUNTER — HOSPITAL ENCOUNTER (EMERGENCY)
Facility: HOSPITAL | Age: 70
Discharge: HOME | End: 2025-01-28
Payer: MEDICARE

## 2025-01-28 ENCOUNTER — APPOINTMENT (OUTPATIENT)
Dept: CARDIOLOGY | Facility: HOSPITAL | Age: 70
End: 2025-01-28
Payer: MEDICARE

## 2025-01-28 VITALS
WEIGHT: 165 LBS | HEART RATE: 95 BPM | DIASTOLIC BLOOD PRESSURE: 88 MMHG | TEMPERATURE: 97 F | OXYGEN SATURATION: 96 % | SYSTOLIC BLOOD PRESSURE: 148 MMHG | RESPIRATION RATE: 16 BRPM | HEIGHT: 64 IN | BODY MASS INDEX: 28.17 KG/M2

## 2025-01-28 VITALS
TEMPERATURE: 98.1 F | HEIGHT: 64 IN | OXYGEN SATURATION: 97 % | WEIGHT: 165 LBS | SYSTOLIC BLOOD PRESSURE: 136 MMHG | BODY MASS INDEX: 28.17 KG/M2 | HEART RATE: 110 BPM | DIASTOLIC BLOOD PRESSURE: 81 MMHG

## 2025-01-28 DIAGNOSIS — R10.13 EPIGASTRIC PAIN: Primary | ICD-10-CM

## 2025-01-28 LAB
ALBUMIN SERPL BCP-MCNC: 4.5 G/DL (ref 3.4–5)
ALP SERPL-CCNC: 56 U/L (ref 33–136)
ALT SERPL W P-5'-P-CCNC: 12 U/L (ref 7–45)
ANION GAP SERPL CALCULATED.3IONS-SCNC: 14 MMOL/L (ref 10–20)
APPEARANCE UR: CLEAR
AST SERPL W P-5'-P-CCNC: 13 U/L (ref 9–39)
BASOPHILS # BLD AUTO: 0.03 X10*3/UL (ref 0–0.1)
BASOPHILS NFR BLD AUTO: 0.4 %
BILIRUB SERPL-MCNC: 0.7 MG/DL (ref 0–1.2)
BILIRUB UR STRIP.AUTO-MCNC: ABNORMAL MG/DL
BUN SERPL-MCNC: 17 MG/DL (ref 6–23)
CALCIUM SERPL-MCNC: 9.4 MG/DL (ref 8.6–10.3)
CHLORIDE SERPL-SCNC: 100 MMOL/L (ref 98–107)
CO2 SERPL-SCNC: 22 MMOL/L (ref 21–32)
COLOR UR: YELLOW
CREAT SERPL-MCNC: 0.87 MG/DL (ref 0.5–1.05)
EGFRCR SERPLBLD CKD-EPI 2021: 72 ML/MIN/1.73M*2
EOSINOPHIL # BLD AUTO: 0.02 X10*3/UL (ref 0–0.7)
EOSINOPHIL NFR BLD AUTO: 0.3 %
ERYTHROCYTE [DISTWIDTH] IN BLOOD BY AUTOMATED COUNT: 13 % (ref 11.5–14.5)
GLUCOSE SERPL-MCNC: 119 MG/DL (ref 74–99)
GLUCOSE UR STRIP.AUTO-MCNC: NEGATIVE MG/DL
HCT VFR BLD AUTO: 46.1 % (ref 36–46)
HGB BLD-MCNC: 16.2 G/DL (ref 12–16)
HOLD SPECIMEN: NORMAL
HYALINE CASTS #/AREA URNS AUTO: ABNORMAL /LPF
IMM GRANULOCYTES # BLD AUTO: 0.03 X10*3/UL (ref 0–0.7)
IMM GRANULOCYTES NFR BLD AUTO: 0.4 % (ref 0–0.9)
KETONES UR STRIP.AUTO-MCNC: ABNORMAL MG/DL
LEUKOCYTE ESTERASE UR QL STRIP.AUTO: NEGATIVE
LIPASE SERPL-CCNC: 16 U/L (ref 9–82)
LYMPHOCYTES # BLD AUTO: 1.31 X10*3/UL (ref 1.2–4.8)
LYMPHOCYTES NFR BLD AUTO: 17.6 %
MCH RBC QN AUTO: 30.3 PG (ref 26–34)
MCHC RBC AUTO-ENTMCNC: 35.1 G/DL (ref 32–36)
MCV RBC AUTO: 86 FL (ref 80–100)
MONOCYTES # BLD AUTO: 0.59 X10*3/UL (ref 0.1–1)
MONOCYTES NFR BLD AUTO: 7.9 %
MUCOUS THREADS #/AREA URNS AUTO: ABNORMAL /LPF
NEUTROPHILS # BLD AUTO: 5.46 X10*3/UL (ref 1.2–7.7)
NEUTROPHILS NFR BLD AUTO: 73.4 %
NITRITE UR QL STRIP.AUTO: NEGATIVE
NRBC BLD-RTO: 0 /100 WBCS (ref 0–0)
PH UR STRIP.AUTO: 6.5 [PH]
PLATELET # BLD AUTO: 309 X10*3/UL (ref 150–450)
POTASSIUM SERPL-SCNC: 3 MMOL/L (ref 3.5–5.3)
PROT SERPL-MCNC: 7.7 G/DL (ref 6.4–8.2)
PROT UR STRIP.AUTO-MCNC: ABNORMAL MG/DL
RBC # BLD AUTO: 5.35 X10*6/UL (ref 4–5.2)
RBC # UR STRIP.AUTO: ABNORMAL /UL
RBC #/AREA URNS AUTO: ABNORMAL /HPF
SODIUM SERPL-SCNC: 133 MMOL/L (ref 136–145)
SP GR UR STRIP.AUTO: >=1.03
SQUAMOUS #/AREA URNS AUTO: ABNORMAL /HPF
UROBILINOGEN UR STRIP.AUTO-MCNC: 0.2 MG/DL
WBC # BLD AUTO: 7.4 X10*3/UL (ref 4.4–11.3)
WBC #/AREA URNS AUTO: ABNORMAL /HPF

## 2025-01-28 PROCEDURE — 2550000001 HC RX 255 CONTRASTS: Performed by: NURSE PRACTITIONER

## 2025-01-28 PROCEDURE — 74177 CT ABD & PELVIS W/CONTRAST: CPT | Mod: FOREIGN READ | Performed by: RADIOLOGY

## 2025-01-28 PROCEDURE — 3075F SYST BP GE 130 - 139MM HG: CPT | Performed by: PHYSICIAN ASSISTANT

## 2025-01-28 PROCEDURE — 3079F DIAST BP 80-89 MM HG: CPT | Performed by: PHYSICIAN ASSISTANT

## 2025-01-28 PROCEDURE — 93005 ELECTROCARDIOGRAM TRACING: CPT

## 2025-01-28 PROCEDURE — 80053 COMPREHEN METABOLIC PANEL: CPT | Performed by: NURSE PRACTITIONER

## 2025-01-28 PROCEDURE — 2500000002 HC RX 250 W HCPCS SELF ADMINISTERED DRUGS (ALT 637 FOR MEDICARE OP, ALT 636 FOR OP/ED): Performed by: NURSE PRACTITIONER

## 2025-01-28 PROCEDURE — 36415 COLL VENOUS BLD VENIPUNCTURE: CPT | Performed by: NURSE PRACTITIONER

## 2025-01-28 PROCEDURE — 83690 ASSAY OF LIPASE: CPT | Performed by: NURSE PRACTITIONER

## 2025-01-28 PROCEDURE — 1159F MED LIST DOCD IN RCRD: CPT | Performed by: PHYSICIAN ASSISTANT

## 2025-01-28 PROCEDURE — 96374 THER/PROPH/DIAG INJ IV PUSH: CPT

## 2025-01-28 PROCEDURE — 2500000004 HC RX 250 GENERAL PHARMACY W/ HCPCS (ALT 636 FOR OP/ED): Performed by: NURSE PRACTITIONER

## 2025-01-28 PROCEDURE — 81001 URINALYSIS AUTO W/SCOPE: CPT | Performed by: NURSE PRACTITIONER

## 2025-01-28 PROCEDURE — 99214 OFFICE O/P EST MOD 30 MIN: CPT | Performed by: PHYSICIAN ASSISTANT

## 2025-01-28 PROCEDURE — 3008F BODY MASS INDEX DOCD: CPT | Performed by: PHYSICIAN ASSISTANT

## 2025-01-28 PROCEDURE — 74177 CT ABD & PELVIS W/CONTRAST: CPT

## 2025-01-28 PROCEDURE — 96375 TX/PRO/DX INJ NEW DRUG ADDON: CPT

## 2025-01-28 PROCEDURE — 99285 EMERGENCY DEPT VISIT HI MDM: CPT | Mod: 25

## 2025-01-28 PROCEDURE — 96361 HYDRATE IV INFUSION ADD-ON: CPT

## 2025-01-28 PROCEDURE — 85025 COMPLETE CBC W/AUTO DIFF WBC: CPT | Performed by: NURSE PRACTITIONER

## 2025-01-28 RX ORDER — POTASSIUM CHLORIDE 20 MEQ/1
40 TABLET, EXTENDED RELEASE ORAL ONCE
Status: COMPLETED | OUTPATIENT
Start: 2025-01-28 | End: 2025-01-28

## 2025-01-28 RX ORDER — FAMOTIDINE 20 MG/1
20 TABLET, FILM COATED ORAL DAILY
Qty: 30 TABLET | Refills: 0 | Status: SHIPPED | OUTPATIENT
Start: 2025-01-28 | End: 2025-02-27

## 2025-01-28 RX ORDER — FAMOTIDINE 10 MG/ML
20 INJECTION INTRAVENOUS ONCE
Status: COMPLETED | OUTPATIENT
Start: 2025-01-28 | End: 2025-01-28

## 2025-01-28 RX ORDER — ONDANSETRON HYDROCHLORIDE 2 MG/ML
4 INJECTION, SOLUTION INTRAVENOUS ONCE
Status: COMPLETED | OUTPATIENT
Start: 2025-01-28 | End: 2025-01-28

## 2025-01-28 RX ORDER — ONDANSETRON 4 MG/1
4 TABLET, ORALLY DISINTEGRATING ORAL EVERY 8 HOURS PRN
Qty: 30 TABLET | Refills: 0 | Status: SHIPPED | OUTPATIENT
Start: 2025-01-28

## 2025-01-28 RX ORDER — PHENOL/SODIUM PHENOLATE
20 AEROSOL, SPRAY (ML) MUCOUS MEMBRANE DAILY
Qty: 30 TABLET | Refills: 0 | Status: SHIPPED | OUTPATIENT
Start: 2025-01-28

## 2025-01-28 RX ADMIN — ONDANSETRON 4 MG: 2 INJECTION INTRAMUSCULAR; INTRAVENOUS at 12:09

## 2025-01-28 RX ADMIN — FAMOTIDINE 20 MG: 10 INJECTION, SOLUTION INTRAVENOUS at 12:09

## 2025-01-28 RX ADMIN — POTASSIUM CHLORIDE 40 MEQ: 1500 TABLET, EXTENDED RELEASE ORAL at 12:58

## 2025-01-28 RX ADMIN — SODIUM CHLORIDE 1000 ML: 900 INJECTION, SOLUTION INTRAVENOUS at 12:05

## 2025-01-28 RX ADMIN — IOHEXOL 75 ML: 350 INJECTION, SOLUTION INTRAVENOUS at 13:16

## 2025-01-28 ASSESSMENT — LIFESTYLE VARIABLES
TOTAL SCORE: 0
EVER FELT BAD OR GUILTY ABOUT YOUR DRINKING: NO
EVER HAD A DRINK FIRST THING IN THE MORNING TO STEADY YOUR NERVES TO GET RID OF A HANGOVER: NO
HAVE YOU EVER FELT YOU SHOULD CUT DOWN ON YOUR DRINKING: NO
HAVE PEOPLE ANNOYED YOU BY CRITICIZING YOUR DRINKING: NO

## 2025-01-28 ASSESSMENT — PAIN SCALES - GENERAL
PAINLEVEL_OUTOF10: 8
PAINLEVEL_OUTOF10: 8

## 2025-01-28 ASSESSMENT — PAIN - FUNCTIONAL ASSESSMENT
PAIN_FUNCTIONAL_ASSESSMENT: 0-10
PAIN_FUNCTIONAL_ASSESSMENT: 0-10

## 2025-01-28 ASSESSMENT — PAIN DESCRIPTION - PROGRESSION: CLINICAL_PROGRESSION: NOT CHANGED

## 2025-01-28 ASSESSMENT — PAIN DESCRIPTION - LOCATION
LOCATION: ABDOMEN
LOCATION: ABDOMEN

## 2025-01-28 ASSESSMENT — PAIN DESCRIPTION - ORIENTATION: ORIENTATION: UPPER

## 2025-01-28 ASSESSMENT — PAIN DESCRIPTION - PAIN TYPE: TYPE: ACUTE PAIN

## 2025-01-28 ASSESSMENT — PAIN DESCRIPTION - DESCRIPTORS: DESCRIPTORS: BURNING

## 2025-01-28 NOTE — PROGRESS NOTES
"Subjective   Patient ID: Emmanuel Israel \"Kia" is a 69 y.o. female. They present today with a chief complaint of Abdominal Pain (Epigastric region. Burning. Started Monday) and Diarrhea (/).    History of Present Illness  Patient is a very pleasant 69-year-old white female, past medical Struve hypertension hyperlipidemia, presented to clinic with chief complaint of severe abdominal pain.  Patient is reporting about 3-day history of worsening and severe epigastric abdominal pain with associated nausea and dry heaving yesterday.  States she has not been able to tolerate any food orally however has been sipping water.  She does note that small sips of water increases her pain however denies any emesis.  She denies any diarrhea.  No fever or chills.  She also reporting approximately 10 pound unintentional weight loss over the course of the past month.  She denies any chest pain or shortness of breath.  No upper respiratory congestion rhinorrhea cough or sputum production.  No myalgias, arthralgias, generalized weakness, fatigue, numbness, tingling, or focal weakness.            Past Medical History  Allergies as of 01/28/2025 - Reviewed 01/28/2025   Allergen Reaction Noted    Latex Unknown and Rash 08/23/2023    Penicillins Unknown and Rash 11/03/2009       (Not in a hospital admission)         No past medical history on file.    Past Surgical History:   Procedure Laterality Date    BACK SURGERY  02/25/2013    Back Surgery    BREAST RECONSTRUCTION Bilateral 02/25/2013    Breast Surgery Reduction Procedure    OTHER SURGICAL HISTORY  04/01/2021    Exploratory laparoscopy    OTHER SURGICAL HISTORY  04/01/2021    Hip replacement        reports that she has never smoked. She has never been exposed to tobacco smoke. She has never used smokeless tobacco. She reports current alcohol use. She reports that she does not use drugs.    Review of Systems  Review of Systems                               Objective    Vitals:    " "01/28/25 1053   BP: 136/81   Pulse: 110   Temp: 36.7 °C (98.1 °F)   TempSrc: Oral   SpO2: 97%   Weight: 74.8 kg (165 lb)   Height: 1.626 m (5' 4\")     No LMP recorded. Patient is postmenopausal.    Physical Exam  Constitutional: Alert, oriented, cooperative, in no acute distress. Appears well nourished and well hydrated.    Head: Normocephalic, atraumatic    Skin: Intact, dry skin. No lesions, rash, petechiae, or purpura.    Eyes: PERRL, EOMs intact, conjunctiva pink with no erythema or exudates. No scleral icterus. Eyelids without lesions.    ENT: Hearing grossly intact. No external deformities. Tympanic membranes intact with visible landmarks. Nares patent, mucus membranes moist. Dentition without lesions. Pharynx clear, uvula midline.    Neck: Trachea midline, no lymphadenopathy. No meningismus.     Pulmonary: Lungs clear to auscultation bilaterally with good chest wall excursion. No rales, rhonchi, or wheezing. No accessory muscle use or stridor.     Cardiac: Regular rate and rhythm. Normal S1 and S2 with no murmur, rub, or gallop. No JVD, carotids without bruits. 2+ DP and PT pulses.     Abdomen: There is marked tenderness to palpation in the epigastric region with associated rebound tenderness and no guarding.  Belly remains soft and nonrigid.  Normoactive bowel sounds. No palpable organomegaly or mass.  No CVA tenderness.     Genitourinary: Exam deferred.    Musculoskeletal: Full range of motion in extremities. No pain, edema, or deformities. Peripheral pulses full and equal. No cyanosis, or clubbing.     Neurological: Cranial nerves II through XII are grossly intact. Normal sensation, no weakness, no focal findings identified.     Psychiatric: Appropriate mood and affect. Calm.  Procedures    Point of Care Test & Imaging Results from this visit    No results found.    Diagnostic study results (if any) were reviewed by Cy Singh PA-C.    Assessment/Plan   Allergies, medications, history, and " pertinent labs/EKGs/Imaging reviewed by Cy Singh PA-C.     Medical Decision Making  Patient was seen by the clinic with complaint of abdominal pain.  On exam patient is nontoxic very well-appearing in his bed comfortably no acute distress.  Vital signs are stable, afebrile.  Chest is clear, heart is regular.  She has marked epigastric abdominal tenderness to palpation with rebound tenderness and no guarding.  Belly is soft.  I have very high clinical concern for acute pancreatitis and feel she warrants further evaluation in the emergency department for likely IV access, IV hydration, blood work, and advanced imaging.  I had a very extensive conversation with the patient regarding my impression plan and reasons report to the ED and she expresses understanding and agreement this plan.  States she will report across the street to [facility].      Orders and Diagnoses  There are no diagnoses linked to this encounter.      Medical Admin Record      Follow Up Instructions  No follow-ups on file.    Patient disposition: ED    Electronically signed by Cy Singh PA-C  11:14 AM

## 2025-01-28 NOTE — ED TRIAGE NOTES
Pt sts severe abd pain with N/V/D. Pt sts she has been feeling dizzy and unsteady for a couple of months now. Pt ambulatory to triage

## 2025-01-28 NOTE — ED PROVIDER NOTES
HPI   Chief Complaint   Patient presents with    Abdominal Pain       HPI  See my MDM      Patient History   No past medical history on file.  Past Surgical History:   Procedure Laterality Date    BACK SURGERY  02/25/2013    Back Surgery    BREAST RECONSTRUCTION Bilateral 02/25/2013    Breast Surgery Reduction Procedure    OTHER SURGICAL HISTORY  04/01/2021    Exploratory laparoscopy    OTHER SURGICAL HISTORY  04/01/2021    Hip replacement     Family History   Problem Relation Name Age of Onset    Cerebral aneurysm Mother      Hypertension Father      Hyperlipidemia Father      Breast cancer Sister  40 - 49    Prostate cancer Brother      Diabetes Sibling      Breast cancer Sibling      Lymphoma Other Paternal half sister     Breast cancer Other Paternal half sister      Social History     Tobacco Use    Smoking status: Never     Passive exposure: Never    Smokeless tobacco: Never   Substance Use Topics    Alcohol use: Yes     Comment: social    Drug use: Never       Physical Exam   ED Triage Vitals [01/28/25 1129]   Temperature Heart Rate Respirations BP   36.1 °C (97 °F) (!) 112 18 (!) 153/96      Pulse Ox Temp Source Heart Rate Source Patient Position   98 % Temporal -- Sitting      BP Location FiO2 (%)     Left arm --       Physical Exam  CONSTITUTIONAL: Vital signs reviewed as charted, well-developed and in no distress  Eyes: Extraocular muscles are intact. Pupils equal round and reactive to light. Conjunctiva are pink.    ENT: Mucous membranes are moist. Tongue in the midline. Pharynx was without erythema or exudates, uvula midline  LUNGS: Breath sounds equal and clear to auscultation. Good air exchange, no wheezes rales or retractions, pulse oximetry is charted.  HEART: Regular rate and rhythm without murmur thrill or rub, strong tones, auscultation is normal.  ABDOMEN: Tenderness in the epigastric region no rebound or guarding noted.  Abdomen soft.  Neuro: The patient is awake, alert and oriented ×3. Moving  all 4 extremities and answering questions appropriately.   MUSCULOSKELETAL: The calves are nontender to palpation. Full gross active range of motion.   PSYCH: Awake alert oriented, normal mood and affect.  Skin:  Dry, normal color, warm to the touch, no rash present.        ED Course & MDM   ED Course as of 01/28/25 1536   Tue Jan 28, 2025   1329 EKG personally interpreted by me performed at 1136  Sinus tachycardia ventricular rate 110 left axis deviation left anterior fascicular block no acute ischemic changes [EF]      ED Course User Index  [EF] Tayler Donis,          Diagnoses as of 01/28/25 1536   Epigastric pain                 No data recorded     [place] Coma Scale Score: 15 (01/28/25 1259 : Gina Chatman RN)                           Medical Decision Making  History obtained from: patient    Vital signs, nursing notes, current medications, past medical history, Surgical history, allergies, social history, family History were reviewed.         HPI:  Patient 69-year-old female present emergency room today 3-day history of epigastric abdominal pain nausea vomiting diarrhea.  States had a hard time keeping anything down.  Does have history of reflux.  Denies fevers chills or night sweats.  Denies dizziness, chest pain, shortness of breath or lower extremity edema.  Denies urinary complaints.  Denies cough or congestion.      10 point ROS was reviewed and negative except Noted above in HPI.  DDX: as listed above          MDM Summary/considerations:  Labs Reviewed   COMPREHENSIVE METABOLIC PANEL - Abnormal       Result Value    Glucose 119 (*)     Sodium 133 (*)     Potassium 3.0 (*)     Chloride 100      Bicarbonate 22      Anion Gap 14      Urea Nitrogen 17      Creatinine 0.87      eGFR 72      Calcium 9.4      Albumin 4.5      Alkaline Phosphatase 56      Total Protein 7.7      AST 13      Bilirubin, Total 0.7      ALT 12     CBC WITH AUTO DIFFERENTIAL - Abnormal    WBC 7.4      nRBC 0.0      RBC 5.35 (*)      Hemoglobin 16.2 (*)     Hematocrit 46.1 (*)     MCV 86      MCH 30.3      MCHC 35.1      RDW 13.0      Platelets 309      Neutrophils % 73.4      Immature Granulocytes %, Automated 0.4      Lymphocytes % 17.6      Monocytes % 7.9      Eosinophils % 0.3      Basophils % 0.4      Neutrophils Absolute 5.46      Immature Granulocytes Absolute, Automated 0.03      Lymphocytes Absolute 1.31      Monocytes Absolute 0.59      Eosinophils Absolute 0.02      Basophils Absolute 0.03     URINALYSIS WITH REFLEX CULTURE AND MICROSCOPIC - Abnormal    Color, Urine Yellow      Appearance, Urine Clear      Specific Gravity, Urine >=1.030      pH, Urine 6.5      Protein, Urine 100 (2+) (*)     Glucose, Urine NEGATIVE      Blood, Urine TRACE (*)     Ketones, Urine TRACE (*)     Bilirubin, Urine MODERATE (2+) (*)     Urobilinogen, Urine 0.2      Nitrite, Urine NEGATIVE      Leukocyte Esterase, Urine NEGATIVE     URINALYSIS MICROSCOPIC WITH REFLEX CULTURE - Abnormal    WBC, Urine 6-10 (*)     RBC, Urine NONE      Squamous Epithelial Cells, Urine 1-9 (SPARSE)      Mucus, Urine 2+      Hyaline Casts, Urine 1+ (*)    LIPASE - Normal    Lipase 16      Narrative:     Venipuncture immediately after or during the administration of Metamizole may lead to falsely low results. Testing should be performed immediately prior to Metamizole dosing.   URINALYSIS WITH REFLEX CULTURE AND MICROSCOPIC    Narrative:     The following orders were created for panel order Urinalysis with Reflex Culture and Microscopic.  Procedure                               Abnormality         Status                     ---------                               -----------         ------                     Urinalysis with Reflex C...[[id number]]  Abnormal            Final result               Extra Urine Gray Tube[[id number]]                            In process                   Please view results for these tests on the individual orders.   EXTRA URINE GRAY TUBE     CT  abdomen pelvis w IV contrast   Final Result   1. Findings compatible with a moderate to severe enteritis with mild   colitis   2. Mild gastric wall thickening suggestive of accompanying gastritis.   3. Small hiatal hernia.   4. Mild hepatic steatosis.   5. Cholelithiasis.   Signed by Adrian Hurley MD        Medications   famotidine PF (Pepcid) injection 20 mg (20 mg intravenous Given 1/28/25 1209)   ondansetron (Zofran) injection 4 mg (4 mg intravenous Given 1/28/25 1209)   sodium chloride 0.9 % bolus 1,000 mL (0 mL intravenous Stopped 1/28/25 1255)   potassium chloride CR (Klor-Con M20) ER tablet 40 mEq (40 mEq oral Given 1/28/25 1258)   iohexol (OMNIPaque) 350 mg iodine/mL solution 75 mL (75 mL intravenous Given 1/28/25 1316)     Discharge Medication List as of 1/28/2025  3:20 PM        START taking these medications    Details   famotidine (Pepcid) 20 mg tablet Take 1 tablet (20 mg) by mouth once daily., Starting Tue 1/28/2025, Until u 2/27/2025, Normal      omeprazole (PriLOSEC) 20 mg tablet,delayed release (DR/EC) EC tablet Take 1 tablet (20 mg) by mouth once daily., Starting Tue 1/28/2025, Normal      ondansetron ODT (Zofran-ODT) 4 mg disintegrating tablet Dissolve 1 tablet (4 mg) in the mouth every 8 hours if needed for nausea or vomiting., Starting Tue 1/28/2025, Normal           I estimate there is a low risk for acute appendicitis, bowel extraction, cystitis, diverticulitis, incarcerated hernia, mesenteric ischemia, pancreatitis, or perforated bowel or ulcer, thus I considered the discharge disposition reasonable. There is no evidence of peritonitis, sepsis, or toxicity. I have discussed the diagnosis and risks, and we agreed with discharging home to follow-up with the primary care doctor. We also discussed returning to the emergency department immediately if new or worsening symptoms occur. Symptoms of most concern that we discussed are bloody stool, fever, changing or worsening pain, or vomiting that  necessitates immediate return.    CT scan showing enteritis, will treat with Pepcid and omeprazole.  Recommended following with gastroenterology referral was given.  Rest of her workup shows normal urinalysis hypokalemia at 3.0 with replacement given hyponatremia of 133 likely due to dehydration IV fluids were given.  She was hemoconcentrated with hemoglobin 16.2 hematocrit 46.1.  Patient was feeling better after meds here in the ED.  Discharged on a brat diet, increasing fluids    All of the patient's questions were answered to the best of my ability.  Patient states understanding that they have been screened for an emergency today and we have not found any etiology of symptoms that requires emergent treatment or admission to the hospital at this point. They understand that they have not had definitive care day and require follow-up for treatment of their condition. They also state understanding that they may have an emergent condition that may potentially have not of detected at this visit and they must return to the emergency department if they develop any worsening of symptoms or new complaints.      Discussed H&P with supervising physician, aware of results and agrees with plan/ disposition.            Critical Care: Not warranted at this time        This chart was completed using voice recognition transcription software. Please excuse any errors of transcription including grammatical, punctuation, syntax and spelling errors.  Please contact me with any questions regarding this chart.    Procedure  Procedures     ALINA Krause-CNP  01/28/25 8977

## 2025-01-29 LAB
ATRIAL RATE: 110 BPM
P AXIS: 26 DEGREES
P OFFSET: 190 MS
P ONSET: 134 MS
PR INTERVAL: 160 MS
Q ONSET: 214 MS
QRS COUNT: 18 BEATS
QRS DURATION: 84 MS
QT INTERVAL: 360 MS
QTC CALCULATION(BAZETT): 487 MS
QTC FREDERICIA: 441 MS
R AXIS: -75 DEGREES
T AXIS: 30 DEGREES
T OFFSET: 394 MS
VENTRICULAR RATE: 110 BPM

## 2025-01-30 ENCOUNTER — APPOINTMENT (OUTPATIENT)
Dept: PRIMARY CARE | Facility: CLINIC | Age: 70
End: 2025-01-30
Payer: MEDICARE

## 2025-03-20 ENCOUNTER — APPOINTMENT (OUTPATIENT)
Dept: PRIMARY CARE | Facility: CLINIC | Age: 70
End: 2025-03-20
Payer: MEDICARE

## 2025-03-21 ENCOUNTER — HOSPITAL ENCOUNTER (OUTPATIENT)
Dept: RADIOLOGY | Facility: EXTERNAL LOCATION | Age: 70
Discharge: HOME | End: 2025-03-21

## 2025-03-21 ENCOUNTER — APPOINTMENT (OUTPATIENT)
Dept: ORTHOPEDIC SURGERY | Facility: CLINIC | Age: 70
End: 2025-03-21
Payer: MEDICARE

## 2025-03-21 DIAGNOSIS — M17.0 OSTEOARTHRITIS OF BOTH KNEES, UNSPECIFIED OSTEOARTHRITIS TYPE: Primary | ICD-10-CM

## 2025-03-21 RX ORDER — TRIAMCINOLONE ACETONIDE 40 MG/ML
80 INJECTION, SUSPENSION INTRA-ARTICULAR; INTRAMUSCULAR
Status: COMPLETED | OUTPATIENT
Start: 2025-03-21 | End: 2025-03-21

## 2025-03-21 RX ORDER — LIDOCAINE HYDROCHLORIDE 10 MG/ML
4 INJECTION, SOLUTION INFILTRATION; PERINEURAL
Status: COMPLETED | OUTPATIENT
Start: 2025-03-21 | End: 2025-03-21

## 2025-03-21 RX ADMIN — TRIAMCINOLONE ACETONIDE 80 MG: 40 INJECTION, SUSPENSION INTRA-ARTICULAR; INTRAMUSCULAR at 09:24

## 2025-03-21 RX ADMIN — LIDOCAINE HYDROCHLORIDE 4 ML: 10 INJECTION, SOLUTION INFILTRATION; PERINEURAL at 09:24

## 2025-03-21 ASSESSMENT — ENCOUNTER SYMPTOMS
KNEE SWELLING: 1
KNEE DEFORMITY: 1

## 2025-03-21 NOTE — PROGRESS NOTES
"Subjective   Emmanuel R Jhonatan \"Cait\" is a 69 y.o. female who presents for Follow-up of the Left Knee (CSI) and Follow-up of the Right Knee (Durolane inj. Dept provided/)    Right Knee     Left Knee       3/21/25: Patient returns today with complaint of bilateral knee pain.  We did perform a right knee Durolane injection for her on 7/18/2024 that worked well up until recently.  Her pain is since returned and become progressively worse.  Considering this, she elected for repeat right knee Durolane injection.  Additionally, she does feel that her left knee pain has returned.  We did perform a left knee corticosteroid injection for her on 11/10/2023.  She would like to have a repeat left knee corticosteroid injection today.  No additional complaints this time.    7/18/24: Patient returns today for right knee pain.  We did perform a right knee Durolane injection for her on 11/10/2023.  She felt this worked quite well for her in regards to pain control functionality.  Her pain is since returned and become progressively worse.  Consider this, she would like of a repeat Durolane injection today.  No additional complaints.    11/10/23: Patient returns today for bilateral knee pain.  She has known bilateral knee DJD.  She did have a left knee corticosteroid junction performed on 2/6/2023 that worked well for her up until recently.  Therefore, she would like to have a repeat left knee corticosteroid injection.  Additionally, she has had a right knee Durolane injection on 3/20/2023 that worked well for her up until recently.  Considering this, she would like to have a right knee Durolane injection.    3/20/23: Patient returns today for her right knee pain.  Continues to have pain relief from the previous corticosteroid injection for her left knee, over her right knee pain has returned.  During her last visit on 2/6/2023, we discussed the option of longer lasting viscosupplementation.  She elected to proceed and she returns " today for the injection.  She denies any recent injuries.  She has no additional complaints at this time.    2/06/2023: Returned for follow up of chronic atraumatic right knee pain status post corticosteroid injection on 9/26/2022. She reports some relief with prior injection for 1 month duration. She notes that she has been busy to return though feels the injection did help and would like to repeat. She feels brace has helped some as well. She feels her left knee has started to cause her some pain recently and would like her left knee assessed as well. She denies interval trauma or injury. Continuing APAP daily for breakthrough pain. No other concerns reported otherwise.     9/26/2022: Emmanuel is a 67 year old retired female retired  who presents as a new patient for evaluation of chronic right knee pain. She reports having right knee pain for at least 6 months duration without known trauma or injury. She states she had a meniscectomy to the right knee over 20 years ago but otherwise denies any other knee surgeries or ever having an injection to the right knee. She reports a gradual onset of her knee pain    ROS: All pertinent positive symptoms are included in the history of present illness.    All other systems have been reviewed and are negative and noncontributory to this patient's current ailments.    Objective     There were no vitals filed for this visit.    Physical Exam  General: Patient is well appearing/well developed, pleasant and in no apparent distress  Head/Neck: Atraumatic and normocephalic; trachea midline  Eyes: EOMI   Integumentary: without rashes, erythema, abrasions/lacerations or ecchymosis  Vascular: pulses intact, no evidence of edema   Respiratory: no sings of acute respiratory distress or increased respiratory effort    Neurological: Intact sensation, normal strength, no asymmetry noted, no evidence of paraesthesia noted.  Musculoskeletal:   The RIGHT knee is with trace  "joint effusion without obvious signs of acute bony deformity, swelling, erythema, ecchymosis. The patella is without tenderness. Apprehension is negative with medial and lateral glide. Patella crepitus is positive. Patella grind is positive. The medial joint line is tender without bony crepitus or step-off. The lateral joint line is nontender and without bony crepitus or step-off. Flexion & extension are full and symmetrical. Varus stress test at 0° and 30° of flexion, valgus stress, Bin's positive. Anterior and posterior drawer are all negative.   The LEFT knee is with trace joint effusion without obvious signs of acute bony deformity, swelling, erythema, ecchymosis. The patella is without tenderness. Apprehension is negative with medial and lateral glide. Patella crepitus is positive. Patella grind is positive. The medial joint line is tender without bony crepitus or step-off. The lateral joint line is nontender and without bony crepitus or step-off. Flexion & extension are full and symmetrical. Varus stress test at 0° and 30° of flexion, valgus stress, Bin's positive. Anterior and posterior drawer are all negative.       Patient ID: Emmanuel Israel \"Kia" is a 69 y.o. female.    L Inj/Asp: R knee on 3/21/2025 9:07 AM  Indications: pain  Details: 21 G needle, ultrasound-guided superolateral approach  Medications: 60 mg sodium hyaluronate 60 mg/3 mL  Outcome: tolerated well, no immediate complications  Procedure, treatment alternatives, risks and benefits explained, specific risks discussed. Consent was given by the patient. Immediately prior to procedure a time out was called to verify the correct patient, procedure, equipment, support staff and site/side marked as required. Patient was prepped and draped in the usual sterile fashion.       L Inj/Asp: L knee on 3/21/2025 9:24 AM  Indications: pain  Details: 25 G needle, ultrasound-guided superolateral approach  Medications: 80 mg triamcinolone " acetonide 40 mg/mL; 4 mL lidocaine 10 mg/mL (1 %)  Outcome: tolerated well, no immediate complications  Procedure, treatment alternatives, risks and benefits explained, specific risks discussed. Consent was given by the patient. Immediately prior to procedure a time out was called to verify the correct patient, procedure, equipment, support staff and site/side marked as required. Patient was prepped and draped in the usual sterile fashion.         Assessment/Plan   Problem List Items Addressed This Visit    None  Visit Diagnoses       Osteoarthritis of both knees, unspecified osteoarthritis type        Relevant Orders    Point of Care Ultrasound (Completed)          Considering that she did well with previous right knee Durolane injection and left knee corticosteroid injection, I do feel that repeat injections are warranted today.  Discussed risks versus benefits of having injections performed. Patient has elected to proceed with procedures. Please refer to procedure notes above. Discussed with patient to limit weightbearing activities over the next 24-48 hours, they can then progress to full activities as tolerated. Discussed with patient to avoid water submersion over the next 2 days. Discussed with patient to call me immediately if they develop worsening pain, rash, erythema, or fevers. Patient should follow-up as needed if pain persist or worsens.    Asael Pacheco, DO  Sports Medicine  [facility]     ** Please excuse any errors in grammar or translation related to this dictation. Voice recognition software was utilized to prepare this document. **

## 2025-04-15 NOTE — PROGRESS NOTES
"Subjective   Bernardine R Israel \"Cait\" is a 70 y.o. female who presents for FOLLOW UP VISIT for HTN,  BP CHECK, and other health issues.    HPI:      70 y.o. female who presents for FOLLOW UP VISIT for HTN,  BP CHECK, and other health issues.     EMR/EPIC records reviewed.     Last seen by me on 10/31/24 for  FOLLOW UP FOR HTN and BP CHECK. At visit:    HTN: mild elevated, asymptomatic. Patient reports home BP 120s/70s.  -cont losartan-hydrochlorothiazide 100-25 mg daily in AM  -if BP elevated at follow up visit will start amlodipine 5 mg daily in AM  -low salt, low cholesterol diet, regularly exercise, and limit alcohol intake     Hyperlipidemia: well controlled  -Continue simvastatin 20 mg daily  -low salt, low cholesterol diet, regularly exercise, and limit alcohol intake     Anxiety: well controlled. no red flag signs/sxs  -cont celexa 20 mg daily     Lung nodules on CT Chest wo IV contrast 9/5/24: evaluated by pulmonology with recommendations for repeat CT Chest wo contrast in 1 year  -cont management by pulmonology; has scheduled follow up appt  -CT chest next due in 1 year; DUE 9/5/25     Acute to subacute minimally displaced fracture deformities of the left lateral 8th and 9th ribs on CT Chest wo IV contrast 9/5/24: asymptomatic. No red flag signs/sxs. Evaluated by sports medicine.  -cont management by sports medicine   -Emergency Dept precautions discussed and reviewed with patient         Immunizations Counseling  -TDAP now due==> declined today  -recommend updated COVID spike vaccine, and shingrix that can be obtained at local pharmacy     FOLLOW-UP:  12 weeks      PMHx:  HTN  Hyperlipidemia  BRIANNE on CPAP  Insomnia  Anxiety; on citalopram  GERD  Right knee pain; follows with sports medicine  Lung nodule; 2 mm on CT Heart Ca scoring 3/18/22        CT Chest wo IV contrast (9/5/24)  Interpreted By: Hilario Akers,   Radiology IMPRESSION:  1.  Stable size of a 0.3 cm solid nodule within the left lower " lobe,  likely benign. However, if patient has high risk factors for lung  cancer consider annual low-dose CT chest follow-up for further  evaluation.  2. Acute to subacute minimally displaced fracture deformities of the  left lateral 8th and 9th ribs. Otherwise, no acute cardiopulmonary  process.  3. Unchanged appearance of a small sliding type 1 hiatal hernia.  4. Cholelithiasis without evidence of cholecystitis.     ==> patient advised of referral to pulmonology ordered for evaluation of lung nodule and Will plan on repeat lung imaging in 1 year to monitor stability of lung nodule   ==> patient advised I have ordered a referral to orthopedic surgery for rib fracture and if she develop chest pain, heart palpitations, shortness of breath, or coughs up blood please call [phone]            Healthcare Providers:  Sports medicine: Hitesh Pacheco DO   Dermatology: Jamila Collado PA-C   Prior PCP: Danita AVENDAÑO     Preventive Health Services:  -Last physical/Medicare Wellness Visit: 2/14/24==> NEXT DUE 2/14/25==> NOW DUE  -last mammogram: 2/15/24 No mammographic evidence of malignancy ==> NEXT DUE 2/15/25  -last colonoscopy: UTD (last 3/22/17) - DUE IN 10 yrs ==> NEXT DUE 3/22/27  -last STI screening:  -Hep C screening: negative 2/14/24  -DEXA: 9/5/24 NORMAL   -CT Heart Ca scoring (3/18/22):  Radiology IMPRESSION:  1. Coronary artery calcium score of 0 *.  2. Small, 2 mm nodule in the left lower lobe. If the patient is at  low risk for primary lung cancer (i.e. No significant smoking  history), then no follow-up is necessary. If the patient is at high  risk for primary lung cancer (i.e. Significant smoking history), then  the recommendation is for optional follow-up in 12 months per  Fleischner society guidelines, as detailed below.        DEXA (9/5/24):  Interpreted By: Naya Fisher,   IMPRESSION:  DEXA:  According to World Health Organization criteria,  classification is normal.     Immunizations:   -Childhood  "vaccines: completed per patient    -updated COVID spike vaccine: NOW DUE  -TDAP:  NOW DUE  Immunization History   Administered Date(s) Administered    COVID-19, mRNA, LNP-S, PF, 30 mcg/0.3 mL dose 02/25/2021, 03/18/2021, 10/01/2021    DTaP vaccine, pediatric  (INFANRIX) 09/24/2018    Flu vaccine, quadrivalent, high-dose, preservative free, age 65y+ (FLUZONE) 09/22/2020, 10/01/2021    Flu vaccine, trivalent, preservative free, HIGH-DOSE, age 65y+ (Fluzone) 09/24/2018, 09/22/2020, 09/17/2024    Flu vaccine, trivalent, preservative free, age 6 months and greater (Fluarix/Fluzone/Flulaval) 09/05/2013    Influenza, Seasonal, Quadrivalent, Adjuvanted 10/12/2022, 10/14/2023    Influenza, Unspecified 09/22/2009, 10/20/2010, 09/15/2014    Novel influenza-H1N1-09, preservative-free 11/15/2009    Pfizer COVID-19 vaccine, 12 years and older, (30mcg/0.3mL) (Comirnaty) 10/14/2023    Pfizer Gray Cap SARS-CoV-2 06/17/2022    Pneumococcal conjugate vaccine, 20-valent (PREVNAR 20) 02/22/2023    Pneumococcal polysaccharide vaccine, 23-valent, age 2 years and older (PNEUMOVAX 23) 02/23/2022    RSV, 60 Years And Older (AREXVY) 10/14/2023    Zoster, live 11/01/2015              INTERVAL HISTORY:     -seen in ED 1/28/25 for abdominal pain and diagnosed with gastritis and discharged home with omeprazole, pepcid, and referral to GI. ED provider note, imaging, and labs reviewed.    -saw sports medicine for bilateral knee pain and received bilateral knee injections    CT A/P w IV contrast (1/28/25):    Radiology \"IMPRESSION:  1. Findings compatible with a moderate to severe enteritis with mild  colitis  2. Mild gastric wall thickening suggestive of accompanying gastritis.  3. Small hiatal hernia.  4. Mild hepatic steatosis.  5. Cholelithiasis.\"           Today Cait reports:     - intermittent dizziness when changing positions rated in severity as 5/10, not worsening over time. Exacerbated by turning her head.  She denies fevers/chills, " headache, tiinnitis, CP, heart palpations, SOB, cough, diaphoresis, LE swelling. She expressed interest  in referral to ENT.     -otherwise doing and feeling well.     -resolved abdominal pain since ED visit, and has changed her diet that has been helpful    -saw GI who completed an EGD and she was told it was normal     -taking all medications as prescribed with no reported adverse medication side effects     -following a low salt, low cholesterol diet     -home BP: 120s/70s    -has scheduled appt with pulmonology             Today she has no other reported complaints, issues, or problems.     ROS is NEG for HEADACHE, NAUSEA, VOMITING, DIARRHEA, CHEST PAIN, SOB, and BLEEDING.  Review of systems (12) is negative for all systems except for any identified issues in HPI above.          Objective     /74   Pulse 87   Temp 36.8 °C (98.3 °F)   Wt 72.3 kg (159 lb 6.4 oz)   SpO2 98%   BMI 27.36 kg/m²      Physical Examination:       GENERAL           General Appearance: well-appearing, well-developed, well-hydrated, well-nourished, no acute distress.        HEENT           NECK supple, no masses or thyromegaly, no carotid bruit.        EYES           Extraocular Movements: normal, bilateral eyes CATHIE, no conjunctival injection.        HEART           Rate and Rhythm regular rate and rhythm. Heart sounds: normal S1S2, no S3 or S4. Murmurs: none.        CHEST           Breath sounds: Clear to IPPA, RR<16 no use of accessory muscles.        ABDOMEN           General: soft NDNT to palpation, no HSM, neg for LKKS or masses, no scleral icterus or jaundice.        MUSCULOSKELETAL           Joints Demonstration: Neg for erythema, swelling or joint deformities. gross abnormalities no gross abnormalities.        EXTREMITIES           Lower Extremities: Neg for cyanosis, clubbing or edema.       Assessment/Plan   Problem List Items Addressed This Visit       Anxiety disorder    Hyperlipidemia    Relevant Orders    Lipid  panel     Other Visit Diagnoses         Primary hypertension    -  Primary    Relevant Orders    Comprehensive metabolic panel    Urinalysis with Reflex Microscopic      Lung nodule          Hepatic steatosis        Relevant Orders    Referral to Gastroenterology    BI mammo bilateral screening tomosynthesis      Enteritis        Relevant Orders    BI mammo bilateral screening tomosynthesis      Colitis        Relevant Orders    BI mammo bilateral screening tomosynthesis      Gastric wall thickening        Relevant Orders    BI mammo bilateral screening tomosynthesis      Chronic pain of both knees          Breast cancer screening by mammogram          Immunization counseling          Encounter for screening mammogram for malignant neoplasm of breast        Relevant Orders    BI mammo bilateral screening tomosynthesis          HTN: well controlled. Patient reports home BP 120s/70s.  -CMP, UA ordered  -cont losartan-hydrochlorothiazide 100-25 mg daily in AM  -low salt, low cholesterol diet, regularly exercise, and limit alcohol intake    Dizziness: no red flag signs/sxs. Suspect BPPV  -referral to ENT  -emergency dept and 911/EMS precautions discussed and reviewed with patient     Hyperlipidemia: well controlled  -lipid panel ordered  -Continue simvastatin 20 mg daily  -low salt, low cholesterol diet, regularly exercise, and limit alcohol intake    Hepatic steatosis, moderate to severe enteritis with mild colitis, and Mild gastric wall thickening suggestive of accompanying gastritis on CT A/P with contrast 1/28/25==> resolved abdominal pain. Followed by GI  -cont management by GI   -CMP ordered  -cont pepcid and omeprazole as needed  -Emergency Dept precautions discussed and reviewed with patient     Anxiety: well controlled. no red flag signs/sxs  -cont celexa 20 mg daily     Lung nodules on CT Chest wo IV contrast 9/5/24: evaluated by pulmonology with recommendations for repeat CT Chest wo contrast in 1 year  -cont  management by pulmonology; has scheduled follow up appt  -CT chest next due in 1 year; DUE 9/5/25     Acute to subacute minimally displaced fracture deformities of the left lateral 8th and 9th ribs on CT Chest wo IV contrast 9/5/24: asymptomatic. No red flag signs/sxs. Evaluated by sports medicine.  -cont management by sports medicine   -Emergency Dept precautions discussed and reviewed with patient    Breast Cancer screening: NOW DUE  -mammogram ordered          Counseling:       Medication education:         Education:  The patient is counseled regarding potential side-effects of all new medications        Understanding:  Patient expressed understanding        Adherence:  No barriers to adherence identified        Immunizations Counseling  -TDAP now due==> declined today  -recommend updated COVID spike vaccine, and shingrix that can be obtained at local pharmacy     FOLLOW-UP:  4 weeks for Annual Medicare Wellness Visit     I have personally reviewed all available pertinent labs, imaging, and consult notes with the patient.         Discussed recommended plan of care with patient. Patient expressed understanding and agreement with plan of care. All of patient's questions were answered at the time. Patient had no additional questions at the time.         Latrice Cartagena MD, PhD

## 2025-04-18 ENCOUNTER — OFFICE VISIT (OUTPATIENT)
Dept: PRIMARY CARE | Facility: CLINIC | Age: 70
End: 2025-04-18
Payer: MEDICARE

## 2025-04-18 VITALS
DIASTOLIC BLOOD PRESSURE: 74 MMHG | SYSTOLIC BLOOD PRESSURE: 118 MMHG | OXYGEN SATURATION: 98 % | BODY MASS INDEX: 27.36 KG/M2 | TEMPERATURE: 98.3 F | HEART RATE: 87 BPM | WEIGHT: 159.4 LBS

## 2025-04-18 DIAGNOSIS — Z12.31 BREAST CANCER SCREENING BY MAMMOGRAM: ICD-10-CM

## 2025-04-18 DIAGNOSIS — K52.9 COLITIS: ICD-10-CM

## 2025-04-18 DIAGNOSIS — K52.9 ENTERITIS: ICD-10-CM

## 2025-04-18 DIAGNOSIS — F41.1 GENERALIZED ANXIETY DISORDER: ICD-10-CM

## 2025-04-18 DIAGNOSIS — I10 PRIMARY HYPERTENSION: Primary | ICD-10-CM

## 2025-04-18 DIAGNOSIS — R91.1 LUNG NODULE: ICD-10-CM

## 2025-04-18 DIAGNOSIS — M25.562 CHRONIC PAIN OF BOTH KNEES: ICD-10-CM

## 2025-04-18 DIAGNOSIS — E78.2 MIXED HYPERLIPIDEMIA: ICD-10-CM

## 2025-04-18 DIAGNOSIS — K31.89 GASTRIC WALL THICKENING: ICD-10-CM

## 2025-04-18 DIAGNOSIS — R42 DIZZINESS: ICD-10-CM

## 2025-04-18 DIAGNOSIS — G89.29 CHRONIC PAIN OF BOTH KNEES: ICD-10-CM

## 2025-04-18 DIAGNOSIS — Z12.31 ENCOUNTER FOR SCREENING MAMMOGRAM FOR MALIGNANT NEOPLASM OF BREAST: ICD-10-CM

## 2025-04-18 DIAGNOSIS — Z71.85 IMMUNIZATION COUNSELING: ICD-10-CM

## 2025-04-18 DIAGNOSIS — K76.0 HEPATIC STEATOSIS: ICD-10-CM

## 2025-04-18 DIAGNOSIS — M25.561 CHRONIC PAIN OF BOTH KNEES: ICD-10-CM

## 2025-04-18 PROCEDURE — 3074F SYST BP LT 130 MM HG: CPT | Performed by: FAMILY MEDICINE

## 2025-04-18 PROCEDURE — 99214 OFFICE O/P EST MOD 30 MIN: CPT | Performed by: FAMILY MEDICINE

## 2025-04-18 PROCEDURE — 3078F DIAST BP <80 MM HG: CPT | Performed by: FAMILY MEDICINE

## 2025-04-18 PROCEDURE — 1125F AMNT PAIN NOTED PAIN PRSNT: CPT | Performed by: FAMILY MEDICINE

## 2025-04-18 PROCEDURE — 1160F RVW MEDS BY RX/DR IN RCRD: CPT | Performed by: FAMILY MEDICINE

## 2025-04-18 PROCEDURE — G2211 COMPLEX E/M VISIT ADD ON: HCPCS | Performed by: FAMILY MEDICINE

## 2025-04-18 PROCEDURE — 1159F MED LIST DOCD IN RCRD: CPT | Performed by: FAMILY MEDICINE

## 2025-04-18 PROCEDURE — 1036F TOBACCO NON-USER: CPT | Performed by: FAMILY MEDICINE

## 2025-04-18 ASSESSMENT — PATIENT HEALTH QUESTIONNAIRE - PHQ9
1. LITTLE INTEREST OR PLEASURE IN DOING THINGS: NOT AT ALL
2. FEELING DOWN, DEPRESSED OR HOPELESS: NOT AT ALL
SUM OF ALL RESPONSES TO PHQ9 QUESTIONS 1 AND 2: 0

## 2025-04-18 ASSESSMENT — PAIN SCALES - GENERAL: PAINLEVEL_OUTOF10: 4

## 2025-04-22 LAB
ALBUMIN SERPL-MCNC: 4.5 G/DL (ref 3.6–5.1)
ALP SERPL-CCNC: 63 U/L (ref 37–153)
ALT SERPL-CCNC: 10 U/L (ref 6–29)
ANION GAP SERPL CALCULATED.4IONS-SCNC: 9 MMOL/L (CALC) (ref 7–17)
APPEARANCE UR: CLEAR
AST SERPL-CCNC: 13 U/L (ref 10–35)
BACTERIA #/AREA URNS HPF: ABNORMAL /HPF
BILIRUB SERPL-MCNC: 0.6 MG/DL (ref 0.2–1.2)
BILIRUB UR QL STRIP: NEGATIVE
BUN SERPL-MCNC: 17 MG/DL (ref 7–25)
CALCIUM SERPL-MCNC: 9.5 MG/DL (ref 8.6–10.4)
CHLORIDE SERPL-SCNC: 101 MMOL/L (ref 98–110)
CHOLEST SERPL-MCNC: 191 MG/DL
CHOLEST/HDLC SERPL: 2.3 (CALC)
CO2 SERPL-SCNC: 29 MMOL/L (ref 20–32)
COLOR UR: YELLOW
CREAT SERPL-MCNC: 0.67 MG/DL (ref 0.6–1)
EGFRCR SERPLBLD CKD-EPI 2021: 94 ML/MIN/1.73M2
GLUCOSE SERPL-MCNC: 84 MG/DL (ref 65–99)
GLUCOSE UR QL STRIP: NEGATIVE
HDLC SERPL-MCNC: 82 MG/DL
HGB UR QL STRIP: NEGATIVE
HYALINE CASTS #/AREA URNS LPF: ABNORMAL /LPF
KETONES UR QL STRIP: NEGATIVE
LDLC SERPL CALC-MCNC: 96 MG/DL (CALC)
LEUKOCYTE ESTERASE UR QL STRIP: ABNORMAL
NITRITE UR QL STRIP: NEGATIVE
NONHDLC SERPL-MCNC: 109 MG/DL (CALC)
PH UR STRIP: 7 [PH] (ref 5–8)
POTASSIUM SERPL-SCNC: 3.8 MMOL/L (ref 3.5–5.3)
PROT SERPL-MCNC: 6.8 G/DL (ref 6.1–8.1)
PROT UR QL STRIP: NEGATIVE
RBC #/AREA URNS HPF: ABNORMAL /HPF
SERVICE CMNT-IMP: ABNORMAL
SODIUM SERPL-SCNC: 139 MMOL/L (ref 135–146)
SP GR UR STRIP: 1.01 (ref 1–1.03)
SQUAMOUS #/AREA URNS HPF: ABNORMAL /HPF
TRIGL SERPL-MCNC: 51 MG/DL
WBC #/AREA URNS HPF: ABNORMAL /HPF

## 2025-04-25 ENCOUNTER — HOSPITAL ENCOUNTER (OUTPATIENT)
Dept: RADIOLOGY | Facility: HOSPITAL | Age: 70
Discharge: HOME | End: 2025-04-25
Payer: MEDICARE

## 2025-04-25 DIAGNOSIS — K52.9 COLITIS: ICD-10-CM

## 2025-04-25 DIAGNOSIS — K76.0 HEPATIC STEATOSIS: ICD-10-CM

## 2025-04-25 DIAGNOSIS — K31.89 GASTRIC WALL THICKENING: ICD-10-CM

## 2025-04-25 DIAGNOSIS — Z12.31 ENCOUNTER FOR SCREENING MAMMOGRAM FOR MALIGNANT NEOPLASM OF BREAST: ICD-10-CM

## 2025-04-25 DIAGNOSIS — K52.9 ENTERITIS: ICD-10-CM

## 2025-04-25 PROCEDURE — 77067 SCR MAMMO BI INCL CAD: CPT

## 2025-05-06 ENCOUNTER — HOSPITAL ENCOUNTER (OUTPATIENT)
Dept: RADIOLOGY | Facility: HOSPITAL | Age: 70
Discharge: HOME | End: 2025-05-06
Payer: MEDICARE

## 2025-05-06 DIAGNOSIS — R42 DIZZINESS: ICD-10-CM

## 2025-05-06 PROCEDURE — 70498 CT ANGIOGRAPHY NECK: CPT | Performed by: RADIOLOGY

## 2025-05-06 PROCEDURE — 2550000001 HC RX 255 CONTRASTS: Performed by: FAMILY MEDICINE

## 2025-05-06 PROCEDURE — 70496 CT ANGIOGRAPHY HEAD: CPT

## 2025-05-06 PROCEDURE — 70496 CT ANGIOGRAPHY HEAD: CPT | Performed by: RADIOLOGY

## 2025-05-06 RX ADMIN — IOHEXOL 50 ML: 350 INJECTION, SOLUTION INTRAVENOUS at 08:37

## 2025-05-15 ENCOUNTER — ANCILLARY PROCEDURE (OUTPATIENT)
Dept: VASCULAR MEDICINE | Facility: CLINIC | Age: 70
End: 2025-05-15
Payer: MEDICARE

## 2025-05-15 DIAGNOSIS — R42 DIZZINESS: ICD-10-CM

## 2025-05-15 PROCEDURE — 93880 EXTRACRANIAL BILAT STUDY: CPT

## 2025-05-15 PROCEDURE — 93880 EXTRACRANIAL BILAT STUDY: CPT | Performed by: INTERNAL MEDICINE

## 2025-05-22 NOTE — PROGRESS NOTES
MEDICARE WELLNESS        HPI:  Emmanuel Israel is a 70 y.o. female here  for  a Medicare Wellness Visit and follow-up for hypertension and other health issues.     EMR/EPIC records reviewed.    Last seen by me on 4/18/2025 for FOLLOW UP VISIT for HTN,  BP CHECK, and other health issues.  At visit:     HTN: well controlled. Patient reports home BP 120s/70s.  -CMP, UA ordered  -cont losartan-hydrochlorothiazide 100-25 mg daily in AM  -low salt, low cholesterol diet, regularly exercise, and limit alcohol intake     Dizziness: no red flag signs/sxs. Suspect BPPV  -referral to ENT  -emergency dept and 911/EMS precautions discussed and reviewed with patient     Hyperlipidemia: well controlled  -lipid panel ordered  -Continue simvastatin 20 mg daily  -low salt, low cholesterol diet, regularly exercise, and limit alcohol intake     Hepatic steatosis, moderate to severe enteritis with mild colitis, and Mild gastric wall thickening suggestive of accompanying gastritis on CT A/P with contrast 1/28/25==> resolved abdominal pain. Followed by GI  -cont management by GI   -CMP ordered  -cont pepcid and omeprazole as needed  -Emergency Dept precautions discussed and reviewed with patient     Anxiety: well controlled. no red flag signs/sxs  -cont celexa 20 mg daily     Lung nodules on CT Chest wo IV contrast 9/5/24: evaluated by pulmonology with recommendations for repeat CT Chest wo contrast in 1 year  -cont management by pulmonology; has scheduled follow up appt  -CT chest next due in 1 year; DUE 9/5/25     Acute to subacute minimally displaced fracture deformities of the left lateral 8th and 9th ribs on CT Chest wo IV contrast 9/5/24: asymptomatic. No red flag signs/sxs. Evaluated by sports medicine.  -cont management by sports medicine   -Emergency Dept precautions discussed and reviewed with patient     Breast Cancer screening: NOW DUE  -mammogram ordered           Immunizations Counseling  -TDAP now due==> declined  "today  -recommend updated COVID spike vaccine, and shingrix that can be obtained at local pharmacy     FOLLOW-UP:  4 weeks for Annual Medicare Wellness Visit       PMHx:  HTN  Hyperlipidemia  BRIANNE on CPAP  Insomnia  Anxiety; on citalopram  GERD  Right knee pain; follows with sports medicine  Lung nodule; 2 mm on CT Heart Ca scoring 3/18/22  Gastric wall thickening  Mild hepatic steatosis    CT A/P w IV contrast (1/28/25):     Radiology \"IMPRESSION:  1. Findings compatible with a moderate to severe enteritis with mild  colitis  2. Mild gastric wall thickening suggestive of accompanying gastritis.  3. Small hiatal hernia.  4. Mild hepatic steatosis.  5. Cholelithiasis.\"        CT Chest wo IV contrast (9/5/24)  Interpreted By: Hilario Akers,   Radiology IMPRESSION:  1.  Stable size of a 0.3 cm solid nodule within the left lower lobe,  likely benign. However, if patient has high risk factors for lung  cancer consider annual low-dose CT chest follow-up for further  evaluation.  2. Acute to subacute minimally displaced fracture deformities of the  left lateral 8th and 9th ribs. Otherwise, no acute cardiopulmonary  process.  3. Unchanged appearance of a small sliding type 1 hiatal hernia.  4. Cholelithiasis without evidence of cholecystitis.     ==> patient advised of referral to pulmonology ordered for evaluation of lung nodule and Will plan on repeat lung imaging in 1 year to monitor stability of lung nodule   ==> patient advised I have ordered a referral to orthopedic surgery for rib fracture and if she develop chest pain, heart palpitations, shortness of breath, or coughs up blood please call 911            Healthcare Providers:  Sports medicine: Hitesh Pacheco DO   Dermatology: Jamila Collado PA-C   GI: Dr. Gooden  Pulmonology: Catina Denise, APRN-CNP   Prior PCP: Danita AVENDAÑO     Preventive Health Services:  -Last physical/Medicare Wellness Visit: 5/23/2025   -last mammogram: 4/25/25 No mammographic " evidence of malignancy ==> NEXT DUE 4/25/26  -last colonoscopy: UTD (last 3/22/17) - DUE IN 10 yrs ==> NEXT DUE 3/22/27  -last STI screening:  -Hep C screening: negative 2/14/24  -DEXA: 9/5/24 NORMAL   -CT Heart Ca scoring (3/18/22):  Radiology IMPRESSION:  1. Coronary artery calcium score of 0 *.  2. Small, 2 mm nodule in the left lower lobe. If the patient is at  low risk for primary lung cancer (i.e. No significant smoking  history), then no follow-up is necessary. If the patient is at high  risk for primary lung cancer (i.e. Significant smoking history), then  the recommendation is for optional follow-up in 12 months per  Fleischner society guidelines, as detailed below.      Immunizations:   -Childhood vaccines: completed per patient    -updated COVID spike vaccine: NOW DUE  -TDAP:  NOW DUE  Immunization History   Administered Date(s) Administered    COVID-19, mRNA, LNP-S, PF, 30 mcg/0.3 mL dose 02/25/2021, 03/18/2021, 10/01/2021    DTaP vaccine, pediatric  (INFANRIX) 09/24/2018    Flu vaccine, quadrivalent, high-dose, preservative free, age 65y+ (FLUZONE) 09/22/2020, 10/01/2021    Flu vaccine, trivalent, preservative free, HIGH-DOSE, age 65y+ (Fluzone) 09/24/2018, 09/22/2020, 09/17/2024    Flu vaccine, trivalent, preservative free, age 6 months and greater (Fluarix/Fluzone/Flulaval) 09/05/2013    Influenza, Seasonal, Quadrivalent, Adjuvanted 10/12/2022, 10/14/2023    Influenza, Unspecified 09/22/2009, 10/20/2010, 09/15/2014    Novel influenza-H1N1-09, preservative-free 11/15/2009    Pfizer COVID-19 vaccine, 12 years and older, (30mcg/0.3mL) (Comirnaty) 10/14/2023    Pfizer Gray Cap SARS-CoV-2 06/17/2022    Pneumococcal conjugate vaccine, 20-valent (PREVNAR 20) 02/22/2023    Pneumococcal polysaccharide vaccine, 23-valent, age 2 years and older (PNEUMOVAX 23) 02/23/2022    RSV, 60 Years And Older (AREXVY) 10/14/2023    Zoster, live 11/01/2015        INTERVAL HISTORY:     - Completed mammogram that was  normal    Carotid ultrasound 5/15/2025  Reading Physician: [id number] Rina Mcgraw   CONCLUSIONS:     Right Carotid: The right proximal internal carotid artery is normal. Right external carotid artery appears patent with no evidence of stenosis. The right vertebral artery is patent with antegrade flow. No evidence of hemodynamically significant stenosis in the right subclavian artery.  Left Carotid: The left proximal internal carotid artery is normal. The internal carotid artery appears tortuous. Left external carotid artery appears patent with no evidence of stenosis. The left vertebral artery is patent with antegrade flow. No evidence of hemodynamically significant stenosis in the left subclavian artery.     Imaging & Doppler Findings:     Right Plaque Morph: No plaque identified in the right carotid artery.     Left Plaque Morph: No plaque identified in the left carotid artery.    CT angio head and neck (5/6/2025)  Interpreted By: Adalid Villegas,   IMPRESSION:  1. 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      2. No stenoses of the cervical carotid/vertebral or intra cerebral  circulations are noted.        Completed labs ordered 4/18/2025.  Based on test results:    -Good cholesterol control, grossly normal urinalysis, normal kidney and liver function,        Today Cait reports:     - Ongoing intermittent dizziness when changing positions rated in severity as 5/10, unchanged since last visit, not worsening over time. Exacerbated by turning her head and rolling over in bed.  She denies fevers/chills, headache, tinnitis, CP, heart palpations, SOB, cough, diaphoresis, LE swelling.  At last visit referred to cardiology and ENT for evaluation, and ordered CT head/neck angio and carotid artery duplex that were completed and normal.     -otherwise doing and feeling well.       -taking  all medications as prescribed with no reported adverse medication side effects     -following a low salt, low cholesterol diet     -home BP: 120s/70s     -has scheduled appt with pulmonology, ENT and audiology    - Does not have scheduled appointment with cardiology for evaluation of dizziness: Patient states that she will call to schedule        Today she has no other reported complaints, issues, or problems.     ROS is NEG for HEADACHE, NAUSEA, VOMITING, DIARRHEA, CHEST PAIN, SOB, and BLEEDING.  Review of systems (12) is negative for all systems except for any identified issues in HPI above.         Health Maintenance    Social History:  Tobacco: Never  EtOH: Denies  Patient reports routine vision checks and dental cleanings, and regular exercise.     Advanced Directives:  Patient does have advanced directives in place.  Counseled and discussed importance with patient during visit today.  Provided assistance as necessary.    Opioid Medications:  Does the patient use opioid medications: No      Overall Health:  How does the patient rate their health status today:  good     Cognitive Screen:  AAAx3  to person, place and time: Yes  3 word recall: Banana, Chair, Sunrise  - Immediate recall: Yes  - 5 minutes recall: Yes  Impression: No cognitive deficiency observed during screening or encounter today     Vision/Hearing Screen:  Vision (required for WELCOME TO MEDICARE VISIT ONLY):  na  Hearing screen: reports no difficulty with hearing and passes finger rub test bilaterally    Immunizations:   -Childhood vaccines: completed per patient    -updated COVID spike vaccine: NOW DUE  -TDAP:  NOW DUE  - Shingrix: Now due  COVID,  pneumonia, and shingles: See below    Immunization History   Administered Date(s) Administered    COVID-19, mRNA, LNP-S, PF, 30 mcg/0.3 mL dose 02/25/2021, 03/18/2021, 10/01/2021    DTaP vaccine, pediatric  (INFANRIX) 09/24/2018    Flu vaccine, quadrivalent, high-dose, preservative free, age 65y+  (FLUZONE) 09/22/2020, 10/01/2021    Flu vaccine, trivalent, preservative free, HIGH-DOSE, age 65y+ (Fluzone) 09/24/2018, 09/22/2020, 09/17/2024    Flu vaccine, trivalent, preservative free, age 6 months and greater (Fluarix/Fluzone/Flulaval) 09/05/2013    Influenza, Seasonal, Quadrivalent, Adjuvanted 10/12/2022, 10/14/2023    Influenza, Unspecified 09/22/2009, 10/20/2010, 09/15/2014    Novel influenza-H1N1-09, preservative-free 11/15/2009    Pfizer COVID-19 vaccine, 12 years and older, (30mcg/0.3mL) (Comirnaty) 10/14/2023    Pfizer Gray Cap SARS-CoV-2 06/17/2022    Pneumococcal conjugate vaccine, 20-valent (PREVNAR 20) 02/22/2023    Pneumococcal polysaccharide vaccine, 23-valent, age 2 years and older (PNEUMOVAX 23) 02/23/2022    RSV, 60 Years And Older (AREXVY) 10/14/2023    Zoster, live 11/01/2015         Preventive Health Services and Screening:  -Last physical/Medicare Wellness Visit: 5/23/2025   -last mammogram: 4/25/25 No mammographic evidence of malignancy ==> NEXT DUE 4/25/26  -last colonoscopy: UTD (last 3/22/17) - DUE IN 10 yrs ==> NEXT DUE 3/22/27  -last STI screening: declined  -Hep C screening: negative 2/14/24  -DEXA: 9/5/24 NORMAL   -CT Heart Ca scoring (3/18/22): Coronary artery calcium score of 0 .     Screening Pap due 21-65, Q3, Q5 with nml HPV after 31 y/o  Screening Mammogram :  yearly ages 40-75, shared decision making age >75  Screening Colonoscopy:  age 45-75, shared decision making age >75  DEXA scan 65 or 60 with risks, [id number] after  AAA screen men 65-75 men with ANY smoking history  Low dose CT of lungs:  yearly for 50-80 with at least 20 year pack history.  Current smokers and those who quit <15 years ago.  Coronary Ca score men >45, women >55,   Hep C screen:  one time all adults age 18-79        Reviewed:   Past Medical History/Allergies:  Yes  Family History:  Yes  Social History:  Yes  Current Medications:  Yes  Vital Signs:  Yes  Advanced Directives:  discussed  Immunizations:   reviewed today  Home Safety:                    Up & Go test > 30 seconds?  No                   Home have rugs; lack grab bars in bathroom; lack handrail on stairs; have poor lighting?  No                   Hearing difficulties?  No  Geriatric Assessment           ADL areas requiring assistance:  Does not need help with , Dressing, Eating, Ambulating, Toileting, Grooming, Hygiene.            IADL areas requiring assistance:  Does not need help with , Shopping, Housework, Accounting, Transportation, Driving.   Medications reviewed           Current supplements  Reviewed and recorded.   Other providers           Reviewed and recorded  Current providers and suppliers:     PHQ9/GAD7:         Current Medications  Current Outpatient Medications   Medication Instructions    citalopram (CELEXA) 20 mg, oral, Daily    hydrOXYzine HCL (ATARAX) 25 mg, oral, Nightly    losartan-hydrochlorothiazide (Hyzaar) 100-25 mg tablet 1 tablet, oral, Daily    omeprazole (PRILOSEC) 20 mg, oral, Daily    ondansetron ODT (ZOFRAN-ODT) 4 mg, oral, Every 8 hours PRN    simvastatin (ZOCOR) 20 mg, oral, Daily    tretinoin (Retin-A) 0.05 % cream Apply topically to clean dry face on MWF. Increase frequency as tolerated.        History  Allergies[1]   Medical History[2]   Surgical History[3]  Family History[4]  Social History[5]  Tobacco Use: Low Risk  (5/23/2025)    Patient History     Smoking Tobacco Use: Never     Smokeless Tobacco Use: Never     Passive Exposure: Never        ROS  All pertinent positive symptoms are included in the history of present illness.  All other systems have been reviewed and are negative and noncontributory to this patient's current ailments.    VITAL SIGNS  Vitals:    05/23/25 0803   BP: 126/78   Pulse: 72   Temp: 36 °C (96.8 °F)   SpO2: 96%     Vitals:    05/23/25 0803   Weight: 72.8 kg (160 lb 6.4 oz)      Body mass index is 27.49 kg/m².     PHYSICAL EXAM    GENERAL  Well-appearing, pleasant and cooperative.  No acute  distress.    HEENT  HEAD:   Normocephalic.  Atraumatic.  EYES:  PERRLA.  No scleral icterus or conjunctival injection.  EARS:  Tympanic membranes visualized bilaterally without erythema, fluid, or bulging.  NECK:  No adenopathy.  No palpable thyroid enlargement or nodules.    THROAT:  Moist oropharynx without tonsillar enlargement or exudates.    LUNGS:    Clear to auscultation bilaterally.  No wheezes, rales, rhonchi.    CARDIAC:  Regular rate and rhythm.  Normal S1S2.  No murmurs/rubs/gallops.    ABDOMEN:  Soft, non-tender, non-distended.  No hepatosplenomegaly.  Normoactive bowel sounds.    MUSCULOSKELETAL:  No gross abnormalities.   No joint swelling or erythema,.  No spinal or paraspinal tenderness to palpation.    EXTREMITIES:  No LE edema or cyanosis.      NEURO           Alert and oriented x3. No focal deficits.    PSYCH:          Affect appropriate.   SKIN: no rashes. No lesions.    Preventative Services reviewed with patient, instructions provided    Assessment/Plan   Assessment & Plan  Medicare annual wellness visit, subsequent         Depression screening         Advanced care planning/counseling discussion         Cardiac risk counseling         Primary hypertension         Mixed hyperlipidemia    Orders:    simvastatin (Zocor) 20 mg tablet; Take 1 tablet (20 mg) by mouth once daily.    Lung nodule         Generalized anxiety disorder    Orders:    citalopram (CeleXA) 20 mg tablet; Take 1 tablet (20 mg) by mouth once daily.    Hepatic steatosis         Dizziness    Orders:    Referral to Cardiology; Future    Immunization counseling         Medication refill    Orders:    citalopram (CeleXA) 20 mg tablet; Take 1 tablet (20 mg) by mouth once daily.    simvastatin (Zocor) 20 mg tablet; Take 1 tablet (20 mg) by mouth once daily.    Essential (primary) hypertension    Orders:    losartan-hydrochlorothiazide (Hyzaar) 100-25 mg tablet; Take 1 tablet by mouth once daily.    Epigastric pain    Orders:     omeprazole (PriLOSEC) 20 mg tablet,delayed release (DR/EC) EC tablet; Take 1 tablet (20 mg) by mouth once daily.    BRIANNE (obstructive sleep apnea)    Orders:    Referral to Adult Sleep Medicine; Future    Anxiety    Orders:    hydrOXYzine HCL (Atarax) 25 mg tablet; Take 1 tablet (25 mg) by mouth once daily at bedtime.       Annual Medicare wellness visit: Completed today    HTN: well controlled. Patient reports home BP 120s/70s.  -cont losartan-hydrochlorothiazide 100-25 mg daily in AM  -low salt, low cholesterol diet, regularly exercise, and limit alcohol intake     Dizziness: no red flag signs/sxs.  Carotid artery duplex and CT angio head and neck normal. suspect BPPV. R/o cardiac and ENT etiology  -referral to ENT previously ; has scheduled appointment  -Referral to cardiology previously ordered; patient advised to call cardiology to schedule appointment  -emergency dept and 911/EMS precautions discussed and reviewed with patient     Hyperlipidemia: well controlled  -Continue simvastatin 20 mg daily  -low salt, low cholesterol diet, regularly exercise, and limit alcohol intake     Hepatic steatosis, moderate to severe enteritis with mild colitis, and Mild gastric wall thickening suggestive of accompanying gastritis on CT A/P with contrast 1/28/25==> resolved abdominal pain. Followed by GI  -cont management by GI   -cont pepcid and omeprazole as needed  -Emergency Dept precautions discussed and reviewed with patient     Anxiety: well controlled. no red flag signs/sxs  -cont celexa 20 mg daily  -trial of hydroxyzine 25 mg at bedtime     Lung nodules on CT Chest wo IV contrast 9/5/24: evaluated by pulmonology with recommendations for repeat CT Chest wo contrast in 1 year  -cont management by pulmonology; has scheduled follow up appt  -CT chest next due in 1 year; DUE 9/5/25==> CT chest wo contrast ordered by pulmonology for 9/2025     Acute to subacute minimally displaced fracture deformities of the left lateral 8th  and 9th ribs on CT Chest wo IV contrast 9/5/24: asymptomatic. No red flag signs/sxs. Evaluated by sports medicine.  -cont management by sports medicine   -Emergency Dept precautions discussed and reviewed with patient    Insomnia: not using CPAP  -referral to sleep medicine ordered to discuss Inspire       Depression Screening  Depression screening completed using the PHQ-2 questions with results documented in the chart/encounter (15min)  (See Rooming Screening section for documentation, and/or progress note for additional information)     Cardiac Risk Assessment: ASCVD Risk Score (9.9%)- on a statin  Cardiovascular risk was discussed and ,if needed, lifestyle modifications recommended, including nutritional choices, exercise, and elimination of habits contributing to risk.  We agreed on a plan to reduce the current cardiovascular risk based on above discussion as needed.  Aspirin use/disuse was discussed and documented in the Problem List of the medical record (under Cardiac Risk Counseling) after reviewing the updated guidelines below:  Consider low dose Aspirin ( mg) use if the benefit for cardiovascular disease prevention outweighs risk for bleeding complications.  In general, low dose ASA should be considered:  In patients WITHOUT prior MI/stroke/PAD (primary prevention):  a.Age <60: Use if 10-year cardiovascular disease risk >20%, with discussion of risks and benefits with patient  b.Age 60-<70: Use if 10-year cardiovascular disease risk >20% and low bleeding (e.g., gastrointestinal) risk, with discussion of risks and benefits with patient  c.Age >=70: Do not use  In patients WITH prior MI/stroke/PAD (secondary prevention):  Generally use unless extremely high bleeding (e.g., gastrointestinal) risk, with discussion of risks and benefits with patient  (~16 min spent discussing above)     Advance Directives Discussion  Advanced Care Planning (including a Living Will, Healthcare POA, as well as specific end  of life choices and/or directives), was discussed with the patient and/or surrogate, voluntarily, and details of that discussion documented in the Problem List (under Advanced Directives Discussion) of the medical record.  (~16 min spent discussing above)     Counseling:      Medication education:        Education:  The patient is counseled regarding potential side-effects of all new medications        Understanding:  Patient expressed understanding        Adherence:  No barriers to adherence identified     Immunizations Counseling  -recommend updated COVID spike vaccine, TDAP, and shingrix that can be obtained at local pharmacy     FOLLOW-UP: 12 weeks     I have personally reviewed all available pertinent labs, imaging, and consult notes with the patient.     Discussed recommended plan of care with patient. Patient expressed understanding and agreement with plan of care. All of patient's  questions were answered at the time. Patient had no additional questions at the time.        Latrice Cartagena MD, PhD         [1]   Allergies  Allergen Reactions    Latex Unknown and Rash    Penicillins Unknown and Rash   [2] History reviewed. No pertinent past medical history.  [3]   Past Surgical History:  Procedure Laterality Date    BACK SURGERY  02/25/2013    Back Surgery    BREAST RECONSTRUCTION Bilateral 02/25/2013    Breast Surgery Reduction Procedure    CATARACT EXTRACTION Left 05/08/2025    OTHER SURGICAL HISTORY  04/01/2021    Exploratory laparoscopy    OTHER SURGICAL HISTORY  04/01/2021    Hip replacement   [4]   Family History  Problem Relation Name Age of Onset    Cerebral aneurysm Mother      Hypertension Father      Hyperlipidemia Father      Breast cancer Sister  40 - 49    Prostate cancer Brother      Diabetes Sibling      Breast cancer Sibling      Lymphoma Other Paternal half sister     Breast cancer Other Paternal half sister    [5]   Social History  Tobacco Use    Smoking status: Never     Passive exposure:  Never    Smokeless tobacco: Never   Vaping Use    Vaping status: Never Used   Substance Use Topics    Alcohol use: Yes     Comment: social    Drug use: Never

## 2025-05-23 ENCOUNTER — OFFICE VISIT (OUTPATIENT)
Dept: PRIMARY CARE | Facility: CLINIC | Age: 70
End: 2025-05-23
Payer: MEDICARE

## 2025-05-23 VITALS
SYSTOLIC BLOOD PRESSURE: 126 MMHG | DIASTOLIC BLOOD PRESSURE: 78 MMHG | BODY MASS INDEX: 27.39 KG/M2 | WEIGHT: 160.4 LBS | TEMPERATURE: 96.8 F | HEART RATE: 72 BPM | OXYGEN SATURATION: 96 % | HEIGHT: 64 IN

## 2025-05-23 DIAGNOSIS — Z71.85 IMMUNIZATION COUNSELING: ICD-10-CM

## 2025-05-23 DIAGNOSIS — F41.9 ANXIETY: ICD-10-CM

## 2025-05-23 DIAGNOSIS — G47.33 OSA (OBSTRUCTIVE SLEEP APNEA): ICD-10-CM

## 2025-05-23 DIAGNOSIS — F41.1 GENERALIZED ANXIETY DISORDER: ICD-10-CM

## 2025-05-23 DIAGNOSIS — R91.1 LUNG NODULE: ICD-10-CM

## 2025-05-23 DIAGNOSIS — I10 PRIMARY HYPERTENSION: ICD-10-CM

## 2025-05-23 DIAGNOSIS — Z00.00 MEDICARE ANNUAL WELLNESS VISIT, SUBSEQUENT: Primary | ICD-10-CM

## 2025-05-23 DIAGNOSIS — Z71.89 ADVANCED CARE PLANNING/COUNSELING DISCUSSION: ICD-10-CM

## 2025-05-23 DIAGNOSIS — I10 ESSENTIAL (PRIMARY) HYPERTENSION: ICD-10-CM

## 2025-05-23 DIAGNOSIS — Z13.31 DEPRESSION SCREENING: ICD-10-CM

## 2025-05-23 DIAGNOSIS — Z76.0 MEDICATION REFILL: ICD-10-CM

## 2025-05-23 DIAGNOSIS — Z71.89 CARDIAC RISK COUNSELING: ICD-10-CM

## 2025-05-23 DIAGNOSIS — K76.0 HEPATIC STEATOSIS: ICD-10-CM

## 2025-05-23 DIAGNOSIS — E78.2 MIXED HYPERLIPIDEMIA: ICD-10-CM

## 2025-05-23 DIAGNOSIS — R42 DIZZINESS: ICD-10-CM

## 2025-05-23 DIAGNOSIS — R10.13 EPIGASTRIC PAIN: ICD-10-CM

## 2025-05-23 PROCEDURE — 3078F DIAST BP <80 MM HG: CPT | Performed by: FAMILY MEDICINE

## 2025-05-23 PROCEDURE — 1159F MED LIST DOCD IN RCRD: CPT | Performed by: FAMILY MEDICINE

## 2025-05-23 PROCEDURE — 99215 OFFICE O/P EST HI 40 MIN: CPT | Performed by: FAMILY MEDICINE

## 2025-05-23 PROCEDURE — G0444 DEPRESSION SCREEN ANNUAL: HCPCS | Performed by: FAMILY MEDICINE

## 2025-05-23 PROCEDURE — 99214 OFFICE O/P EST MOD 30 MIN: CPT | Performed by: FAMILY MEDICINE

## 2025-05-23 PROCEDURE — 1036F TOBACCO NON-USER: CPT | Performed by: FAMILY MEDICINE

## 2025-05-23 PROCEDURE — 3008F BODY MASS INDEX DOCD: CPT | Performed by: FAMILY MEDICINE

## 2025-05-23 PROCEDURE — 3074F SYST BP LT 130 MM HG: CPT | Performed by: FAMILY MEDICINE

## 2025-05-23 PROCEDURE — 1126F AMNT PAIN NOTED NONE PRSNT: CPT | Performed by: FAMILY MEDICINE

## 2025-05-23 PROCEDURE — 99214 OFFICE O/P EST MOD 30 MIN: CPT | Mod: 25 | Performed by: FAMILY MEDICINE

## 2025-05-23 PROCEDURE — 99497 ADVNCD CARE PLAN 30 MIN: CPT | Mod: 33,25 | Performed by: FAMILY MEDICINE

## 2025-05-23 PROCEDURE — G0439 PPPS, SUBSEQ VISIT: HCPCS | Performed by: FAMILY MEDICINE

## 2025-05-23 PROCEDURE — 1160F RVW MEDS BY RX/DR IN RCRD: CPT | Performed by: FAMILY MEDICINE

## 2025-05-23 PROCEDURE — G0446 INTENS BEHAVE THER CARDIO DX: HCPCS | Performed by: FAMILY MEDICINE

## 2025-05-23 PROCEDURE — 99497 ADVNCD CARE PLAN 30 MIN: CPT | Performed by: FAMILY MEDICINE

## 2025-05-23 RX ORDER — HYDROXYZINE HYDROCHLORIDE 25 MG/1
25 TABLET, FILM COATED ORAL NIGHTLY
Qty: 90 TABLET | Refills: 3 | Status: SHIPPED | OUTPATIENT
Start: 2025-05-23 | End: 2026-05-23

## 2025-05-23 RX ORDER — SIMVASTATIN 20 MG/1
20 TABLET, FILM COATED ORAL DAILY
Qty: 90 TABLET | Refills: 3 | Status: SHIPPED | OUTPATIENT
Start: 2025-05-23 | End: 2026-05-23

## 2025-05-23 RX ORDER — CITALOPRAM 20 MG/1
20 TABLET ORAL DAILY
Qty: 90 TABLET | Refills: 3 | Status: SHIPPED | OUTPATIENT
Start: 2025-05-23 | End: 2026-05-23

## 2025-05-23 RX ORDER — PHENOL/SODIUM PHENOLATE
20 AEROSOL, SPRAY (ML) MUCOUS MEMBRANE DAILY
Qty: 90 TABLET | Refills: 3 | Status: SHIPPED | OUTPATIENT
Start: 2025-05-23 | End: 2026-05-23

## 2025-05-23 RX ORDER — LOSARTAN POTASSIUM AND HYDROCHLOROTHIAZIDE 25; 100 MG/1; MG/1
1 TABLET ORAL DAILY
Qty: 90 TABLET | Refills: 3 | Status: SHIPPED | OUTPATIENT
Start: 2025-05-23 | End: 2026-05-23

## 2025-05-23 ASSESSMENT — PAIN SCALES - GENERAL: PAINLEVEL_OUTOF10: 0-NO PAIN

## 2025-05-27 ENCOUNTER — APPOINTMENT (OUTPATIENT)
Dept: OTOLARYNGOLOGY | Facility: CLINIC | Age: 70
End: 2025-05-27
Payer: MEDICARE

## 2025-05-27 ENCOUNTER — CLINICAL SUPPORT (OUTPATIENT)
Dept: AUDIOLOGY | Facility: CLINIC | Age: 70
End: 2025-05-27
Payer: MEDICARE

## 2025-05-27 VITALS — WEIGHT: 160 LBS | BODY MASS INDEX: 27.31 KG/M2 | HEIGHT: 64 IN

## 2025-05-27 DIAGNOSIS — R42 DIZZINESS: ICD-10-CM

## 2025-05-27 DIAGNOSIS — H90.3 SENSORINEURAL HEARING LOSS (SNHL) OF BOTH EARS: ICD-10-CM

## 2025-05-27 DIAGNOSIS — H81.11 BENIGN PAROXYSMAL POSITIONAL VERTIGO OF RIGHT EAR: ICD-10-CM

## 2025-05-27 DIAGNOSIS — H90.3 SENSORINEURAL HEARING LOSS (SNHL) OF BOTH EARS: Primary | ICD-10-CM

## 2025-05-27 DIAGNOSIS — R42 VERTIGO: Primary | ICD-10-CM

## 2025-05-27 PROCEDURE — 92557 COMPREHENSIVE HEARING TEST: CPT | Performed by: AUDIOLOGIST

## 2025-05-27 PROCEDURE — 92550 TYMPANOMETRY & REFLEX THRESH: CPT | Mod: 52 | Performed by: AUDIOLOGIST

## 2025-05-27 PROCEDURE — 1159F MED LIST DOCD IN RCRD: CPT | Performed by: NURSE PRACTITIONER

## 2025-05-27 PROCEDURE — 99203 OFFICE O/P NEW LOW 30 MIN: CPT | Performed by: NURSE PRACTITIONER

## 2025-05-27 PROCEDURE — 3008F BODY MASS INDEX DOCD: CPT | Performed by: NURSE PRACTITIONER

## 2025-05-27 PROCEDURE — 1160F RVW MEDS BY RX/DR IN RCRD: CPT | Performed by: NURSE PRACTITIONER

## 2025-05-27 PROCEDURE — 1036F TOBACCO NON-USER: CPT | Performed by: NURSE PRACTITIONER

## 2025-05-27 NOTE — PROGRESS NOTES
"Subjective   Patient ID: Emmanuel Israel \"Kia" is a 70 y.o. female who presents for Dizziness.  HPI  This patient is referred for evaluation of  episodic vertiginous positional dizziness. The patient is not accompanied by anyone. The approximate duration of her complaints is intermittently over many months.  Her last episode was last night.    The patient describes h brief episodes of spinning triggered by lying in bed, rolling in bed, looking upwards. er dizziness as   When asked about ear pain, headache, phono-photophobia, visual or motion intolerance, sound or pressure induced symptoms, hearing loss, discharge from ear, tinnitus, aural fullness or autophony, the patient admits to occasional headaches, photosensitivity and bilateral hearing loss.  She wears bilateral hearing aids with good benefit.  She reports history of cervical spine surgery causing decreased range of motion but denies any chronic pain or stiffness.     When asked about a significant past otological history including history of prior ear surgery, noise exposure, exposure to ototoxic drugs or agents, and/or family history of hearing loss, the patient admits to recreational noise exposure.    Review of Systems  A comprehensive or 10 points review of the patient's constitutional, neurological, HEENT, pulmonary, cardiovascular and genito-urinary systems showed only those mentioned in history of present illness.    Objective   Physical Exam  Constitutional: no fever, chills, weight loss or weight gain   General appearance: Appears well, well-nourished, well groomed. No acute distress.   Communication: Normal communication   Psychiatric: Oriented to person, place and time. Normal mood and affect.   Neurologic: Cranial nerves II-XII grossly intact and symmetric bilaterally.   Head and Face:   Head: Atraumatic with no masses, lesions or scarring.   Face: Normal symmetry, no paralysis, synkinesis or facial tic. No scars or deformities.   TMJ: " Normal, no trismus.   Eyes: Conjunctiva not edematous or erythematous   Ears: External inspection of ears with no deformity, scars or masses. Bilateral EACs clear and bilateral TMs intact with no signs of effusions   Nose: External inspection of nose: No nasal lesions, lacerations or scars.   Neck: Normal appearing, symmetric, trachea midline.   Cardiovascular: Examination of peripheral vascular system shows no clubbing or cyanosis.   Respiratory: No respiratory distress increased work of breathing. Inspection of the chest with symmetric chest expansion and normal respiratory effort.   Skin: No rashes in the head or neck    Bedside occulomotor function assessment for ocular pursuits and saccades, spontaneous nystagmus was normal.  Bilateral head thrust negative but limited due to decreased range of motion to both sides.  Romberg normal  Fukuda normal  Tandem gait normal  Leonard-Hallpike positive right beating nystagmus in the headdown right position.  Epley maneuver was performed.    My interpretation of the audiogram done today is normal hearing through 2000 Hz sloping to moderate sensorineural hearing loss bilaterally.  Excellent word recognition scores and normal tympanograms bilaterally.  Assessment/Plan     This patient presents for initial evaluation of acute on chronic acquired vertigo secondary to right sided BPPV as well as chronic bilateral sensorineural hearing loss.    Audiogram was reviewed in detail.  Her description of positionally triggered vertigo and exam today are consistent with right BPPV.  The etiology and symptoms  associated with this were reviewed in detail.  She was also given a handout on BPPV which includes home Epley maneuver.  I recommended she maintain her head in neutral position for the next 3 days.  If symptoms have not completely resolved, she will contact my office and we will get her set up for a second maneuver with audiology versus a referral to physical therapy.  Patient is in  agreement with the plan.  All questions were answered to patient's satisfaction.      30 minutes was spent on this patient's visit. More than 50% of that time was spent in counseling regarding the possible etiologies, test results, treatment options and coordinating care.    This note was created using speech recognition transcription software. Despite proofreading, several typographical errors might be present that might affect the meaning of the content. Please call with any questions.         ALINA Thrasher-CNP 05/27/25 9:21 AM

## 2025-05-27 NOTE — PROGRESS NOTES
Chief Complaint   Patient presents with    Hearing Loss    Dizziness       HISTORY:  Emmanuel Israel, age 70 years, was seen for audiogram in conjunction with otolaryngology appointment on 5/27/2025.  Ms. Israel presents with history of bilateral sensorineural hearing loss.  She utilizes binaural hearing aids purchased through [employer] [facility].  Ms. Israel reports onset of positional dizziness beginning November/December 2024.  She reported three to four episodes during this time.  The dizziness returned January 2025.  She notes the dizziness occurs with head movement up and down and when rolling over in bed.  The dizziness is short duration lasting a few minutes at a time.  There is no report of ear pain, ear pressure, middle ear pathology or ear surgery.  Please refer medical records for complete history.    RESULTS:  Prior to testing both external auditory canals were clear and tympanic membranes visualized    Immittance and acoustic reflexes:  Immittance testing yielded TYPE A tympanograms indicating normal middle ear function both ears  Acoustic reflexes were present 500 - 4000 Hz both ears    Audiogram:  Normal hearing levels were obtained 125 - 2000 Hz with a mild to moderate sensorineural hearing loss noted 3000 - 8000 Hz both ears  Speech reception thresholds obtained at 10 dBHL right ear and 15 dBHL left ear  Speech discrimination scores were 100% at 50 dBHL    IMPRESSIONS:  Normal middle ear function noted both ears  Normal acoustic reflexes noted both ears  Mild to moderate sensorineural hearing loss 3000 -8000 Hz both ears    RECOMMENDATIONS:  1.  Follow up with otolaryngology  2.  Retest hearing levels annually  3.  Daily use of hearing aids    time: 561 - 423

## 2025-06-19 ENCOUNTER — OFFICE VISIT (OUTPATIENT)
Dept: CARDIOLOGY | Facility: CLINIC | Age: 70
End: 2025-06-19
Payer: MEDICARE

## 2025-06-19 VITALS
SYSTOLIC BLOOD PRESSURE: 119 MMHG | RESPIRATION RATE: 18 BRPM | DIASTOLIC BLOOD PRESSURE: 76 MMHG | HEART RATE: 78 BPM | WEIGHT: 156 LBS | OXYGEN SATURATION: 95 % | BODY MASS INDEX: 26.63 KG/M2 | HEIGHT: 64 IN

## 2025-06-19 DIAGNOSIS — R42 DIZZINESS: Primary | ICD-10-CM

## 2025-06-19 DIAGNOSIS — I10 ESSENTIAL (PRIMARY) HYPERTENSION: ICD-10-CM

## 2025-06-19 DIAGNOSIS — E78.01 FAMILIAL HYPERCHOLESTEROLEMIA: ICD-10-CM

## 2025-06-19 LAB
ATRIAL RATE: 76 BPM
P AXIS: 36 DEGREES
P OFFSET: 184 MS
P ONSET: 121 MS
PR INTERVAL: 186 MS
Q ONSET: 214 MS
QRS COUNT: 13 BEATS
QRS DURATION: 88 MS
QT INTERVAL: 396 MS
QTC CALCULATION(BAZETT): 445 MS
QTC FREDERICIA: 428 MS
R AXIS: -21 DEGREES
T AXIS: 15 DEGREES
T OFFSET: 412 MS
VENTRICULAR RATE: 76 BPM

## 2025-06-19 PROCEDURE — 93005 ELECTROCARDIOGRAM TRACING: CPT | Performed by: INTERNAL MEDICINE

## 2025-06-19 PROCEDURE — 99202 OFFICE O/P NEW SF 15 MIN: CPT

## 2025-06-19 PROCEDURE — 1159F MED LIST DOCD IN RCRD: CPT | Performed by: INTERNAL MEDICINE

## 2025-06-19 PROCEDURE — 3074F SYST BP LT 130 MM HG: CPT | Performed by: INTERNAL MEDICINE

## 2025-06-19 PROCEDURE — 1036F TOBACCO NON-USER: CPT | Performed by: INTERNAL MEDICINE

## 2025-06-19 PROCEDURE — 3078F DIAST BP <80 MM HG: CPT | Performed by: INTERNAL MEDICINE

## 2025-06-19 PROCEDURE — G2211 COMPLEX E/M VISIT ADD ON: HCPCS | Performed by: INTERNAL MEDICINE

## 2025-06-19 PROCEDURE — 3008F BODY MASS INDEX DOCD: CPT | Performed by: INTERNAL MEDICINE

## 2025-06-19 PROCEDURE — 99204 OFFICE O/P NEW MOD 45 MIN: CPT | Performed by: INTERNAL MEDICINE

## 2025-06-19 RX ORDER — ROSUVASTATIN CALCIUM 20 MG/1
20 TABLET, COATED ORAL DAILY
Qty: 90 TABLET | Refills: 3 | Status: SHIPPED | OUTPATIENT
Start: 2025-06-19 | End: 2026-06-19

## 2025-06-19 ASSESSMENT — ENCOUNTER SYMPTOMS: DEPRESSION: 0

## 2025-06-19 ASSESSMENT — COLUMBIA-SUICIDE SEVERITY RATING SCALE - C-SSRS
2. HAVE YOU ACTUALLY HAD ANY THOUGHTS OF KILLING YOURSELF?: NO
6. HAVE YOU EVER DONE ANYTHING, STARTED TO DO ANYTHING, OR PREPARED TO DO ANYTHING TO END YOUR LIFE?: NO
1. IN THE PAST MONTH, HAVE YOU WISHED YOU WERE DEAD OR WISHED YOU COULD GO TO SLEEP AND NOT WAKE UP?: NO

## 2025-06-19 NOTE — PROGRESS NOTES
"Referred by Latrice Cartagena MD PhD for New Patient Visit and Dizziness     HPI:    Emmanuel Israel \"Cait\" is a 70 y.o. female with pertinent history of Essential hypertension, dyslipidemia, gastroesophageal reflux disease, obstructive sleep apnea maintained on CPAP therapy, anxiety who was referred to cardiology for \"dizziness\".  She was simultaneously referred to audiology as well as ENT.  Per audiology note\" She utilizes binaural hearing aids purchased through [employer]. Ms. Israel reports onset of positional dizziness beginning November/December 2024. She reported three to four episodes during this time. The dizziness returned January 2025. She notes the dizziness occurs with head movement up and down and when rolling over in bed. The dizziness is short duration lasting a few minutes at a time \" She was then seen by ENT who noted that her symptoms are representative of acute on chronic acquired vertigo secondary to right-sided BPPV as well as chronic bilateral sensorineural hearing loss.  That time, examination was most consistent with BPPV of the right, she was taught home Epley maneuvers and asked to advice on symptoms. .     She presents to establish care. She notes that her dizziness is much improved following her visit with ENT.  She has not had a repeat any of the maneuvers, but also notes that she has some difficulty recalling exactly how to perform them.  She had her cholesterol checked, was started on simvastatin.  She denies any chest heaviness, pressure, shortness of breath, dyspnea on exertion.  She is trying to watch her diet, decreasing red meat intake in favor of fish and increased vegetable consumption.  She notes that she is exercising more by walking.  She offers no complaints.      No exacerbating or relieving factors.  Patient denies chest pain and angina.  Pt denies orthopnea, and paroxysmal nocturnal dyspnea.  Pt denies worsening lower extremity edema.  Pt denies palpitations " "or syncope.  No recent falls.  No fever or chills.  No cough.  No change in bowel or bladder habits.  No sick contacts.  No recent travel.    12 point review of systems including (Constitutional, Eyes, ENMT, Respiratory, Cardiac, Gastrointestinal, Neurological, Psychiatric, and Hematologic) was performed and is otherwise negative.    Past medical history reviewed:   has no past medical history on file.    Past surgical history reviewed:   has a past surgical history that includes Breast reconstruction (Bilateral, 02/25/2013); Back surgery (02/25/2013); Other surgical history (04/01/2021); Other surgical history (04/01/2021); and Cataract extraction (Left, 05/08/2025).    Social history reviewed:   reports that she has never smoked. She has never been exposed to tobacco smoke. She has never used smokeless tobacco. She reports current alcohol use of about 1.0 standard drink of alcohol per week. She reports that she does not use drugs.     Family history reviewed:  Family History[1]    Allergies reviewed: Latex and Penicillins     Medications reviewed:   Current Outpatient Medications   Medication Instructions    citalopram (CELEXA) 20 mg, oral, Daily    hydrOXYzine HCL (ATARAX) 25 mg, oral, Nightly    losartan-hydrochlorothiazide (Hyzaar) 100-25 mg tablet 1 tablet, oral, Daily    omeprazole (PRILOSEC) 20 mg, oral, Daily    ondansetron ODT (ZOFRAN-ODT) 4 mg, oral, Every 8 hours PRN    rosuvastatin (CRESTOR) 20 mg, oral, Daily    tretinoin (Retin-A) 0.05 % cream Apply topically to clean dry face on MWF. Increase frequency as tolerated.        Vitals reviewed: Visit Vitals  /76 (BP Location: Right arm, Patient Position: Standing, BP Cuff Size: Adult)   Pulse 78   Resp 18       Physical Exam:   /76 (BP Location: Right arm, Patient Position: Standing, BP Cuff Size: Adult)   Pulse 78   Resp 18   Ht 1.626 m (5' 4\")   Wt 70.8 kg (156 lb)   SpO2 95%   BMI 26.78 kg/m²   General:  Patient is awake, alert, and " oriented.  Patient is in no acute distress.  HEENT:  Pupils equal and reactive.  Normocephalic.  Moist mucosa.    Neck:  No thyromegaly.  Normal Jugular Venous Pressure.  Cardiovascular:  Regular rate and rhythm.  Normal S1 and S2.  1/6 ROD.  Pulmonary:  Clear to auscultation bilaterally.  Abdomen:  Soft. Non-tender.   Non-distended.  Positive bowel sounds.  Lower Extremities:  2+ pedal pulses. No LE edema.  Neurologic:  Cranial nerves intact.  No focal deficit.   Skin: Skin warm and dry, normal skin turgor.   Psychiatric: Normal affect.    Last Labs:  CBC -      Lab Results   Component Value Date    WBC 7.4 01/28/2025    HGB 16.2 (H) 01/28/2025    HCT 46.1 (H) 01/28/2025     01/28/2025        CMP-  Lab Results   Component Value Date    GLUCOSE 84 04/21/2025     04/21/2025    K 3.8 04/21/2025     04/21/2025    CO2 29 04/21/2025    ANIONGAP 9 04/21/2025    BUN 17 04/21/2025    CREATININE 0.67 04/21/2025    EGFR 94 04/21/2025    CALCIUM 9.5 04/21/2025    PROT 6.8 04/21/2025    ALBUMIN 4.5 04/21/2025    AST 13 04/21/2025    ALT 10 04/21/2025    ALKPHOS 63 04/21/2025    BILITOT 0.6 04/21/2025        LIPIDS-  Lab Results   Component Value Date    CHOL 191 04/21/2025    TRIG 51 04/21/2025    HDL 82 04/21/2025    CHHDL 2.3 04/21/2025    VLDL 21 02/14/2024        OTHERS-  Lab Results   Component Value Date    HGBA1C 5.3 02/14/2024        I personally reviewed the patient's recent vitals, labs, medications, orders, EKGs, pertinent cardiac imaging/ echocardiography and ischemic evaluations including stress testing/ cardiac catheterization.    Assessment and Plan:    Problem List Items Addressed This Visit       Dizziness - Primary    Overview   Dizziness occurring in the setting of right-sided BPPV  -Symptoms improving after performance of Epley maneuver           Current Assessment & Plan   Additional information on home management of BPPV provided including links YouTube videos showing  "performance.  --Continue to follow with ENT/as needed         Relevant Orders    ECG 12 lead (Clinic Performed)    Lipid Panel    Lipoprotein a    Comprehensive metabolic panel    Essential (primary) hypertension    Overview   6/19/2025 /76 (BP Location: Right arm, Patient Position: Standing, BP Cuff Size: Adult)          Current Assessment & Plan   Currently controlled  --Continue losartan-hydrochlorothiazide combination pill at 100/25 mg         Hyperlipidemia    Overview   The 10-year ASCVD risk score (Uday WEBBER, et al., 2019) is: 10%    Values used to calculate the score:      Age: 70 years      Sex: Female      Is Non- : No      Diabetic: No      Tobacco smoker: No      Systolic Blood Pressure: 119 mmHg      Is BP treated: Yes      HDL Cholesterol: 82 mg/dL      Total Cholesterol: 191 mg/dL    Lab Results   Component Value Date    CHOL 191 04/21/2025    CHOL 177 02/14/2024    CHOL 204 (H) 08/23/2023     Lab Results   Component Value Date    HDL 82 04/21/2025    HDL 69.3 02/14/2024    HDL 79.3 08/23/2023     Lab Results   Component Value Date    LDLCALC 96 04/21/2025    LDLCALC 87 02/14/2024     Lab Results   Component Value Date    TRIG 51 04/21/2025    TRIG 104 02/14/2024    TRIG 77 08/23/2023     No components found for: \"CHOLHDL\"             Current Assessment & Plan   10 % 10-year ASCVD risk.  She was started on simvastatin which may be troublesome given its multiple medication as well as inability to titrate further.  --Stop simvastatin  --Begin rosuvastatin 20 mg for better lipid profile  --Diet, exercise, lifestyle modification also discussed and included         Relevant Medications    rosuvastatin (Crestor) 20 mg tablet    Other Relevant Orders    Lipid Panel    Lipoprotein a    Comprehensive metabolic panel         Please followup with me in Cardiology clinic within the next 6 months .  Please return to clinic sooner or seek emergent care if your symptoms reoccur or " worsen.    Thank you for allowing me to participate in their care.  Please feel free to call me with any further questions or concerns.        Logan Aponte DO   Division of Cardiovascular Medicine  [facility], [facility]                [1]   Family History  Problem Relation Name Age of Onset    Cerebral aneurysm Mother      Hypertension Father      Hyperlipidemia Father      Breast cancer Sister  40 - 49    Prostate cancer Brother      Diabetes Sibling      Breast cancer Sibling      Lymphoma Other Paternal half sister     Breast cancer Other Paternal half sister

## 2025-06-19 NOTE — PATIENT INSTRUCTIONS
"It was a pleasure seeing you today. I would like to see you back in clinic in  6  months. You can call my office if questions arise between now and our next visit.     Today, we talked about your dizziness     -- You were diagnosed with Right  ear Positional Vertigo.   Vertigo - Epley manoeuvre from [facility] --> Youtube to remember how to do the maneuvers to help.    For Exercise  we like \" [email or web address] \" on youtube.       Can I lower my cholesterol by changing my diet?   Maybe. Some people can lower their cholesterol by changing their diet. But this does not always work. Still, you can improve your overall health by eating better.  https://www.COINPLUS/recipes/  If you have high cholesterol, it might help to avoid or limit saturated fats. These are found in foods like:  ?Red meat  ?Butter  ?Fried foods  ?Cheese  ?Baked goods, such as cookies, cakes, or brownies  Other things that might help lower cholesterol include:  ?Eating more soluble fiber - Soluble fiber is found in fruits, oats, barley, beans, and peas.  ?A vegetarian or vegan diet - A vegetarian diet contains no meat. A vegan diet contains no animal products at all, including meat, eggs, or milk.  ?Replacing meat with soy sometimes - Soy-based products include tofu and tempeh.  In general, you can improve your health by eating lots of fruits, vegetables, and whole grains. You can also cut back on carbohydrates, sweets, and processed foods.  Eating a \"Mediterranean diet\" might help lower your cholesterol. This type of diet:  ?Includes a lot of fruits, vegetables, nuts, and whole grains  ?Uses olive oil instead of other fats  ?Includes some fish, poultry, and dairy products, but not a lot of red meat  What about eggs?   Eggs are OK if you want to eat them, but don't overdo it. The news often has stories about the health benefits or risks of eggs. The truth is, eggs are a good source of protein and do not raise cholesterol much. Saturated fats " "raise cholesterol levels more than eggs do.  Are there specific foods that can lower my cholesterol?   Maybe. There are some foods that seem to help lower cholesterol, including:  ?Foods rich in omega-3 fatty acids - Studies show that people who eat lots of these foods are less likely to have heart disease than those who eat less of them. Examples include oily fish (such as salmon, herring, or tuna), olive oil, and canola oil. It's fine to eat 1 to 2 servings of oily fish a week.  ?Nuts - Some studies show that eating certain nuts can help lower cholesterol. They might even lower the risk of heart attack or death. These nuts include walnuts, almonds, and pistachios.  ?Fiber-rich foods - These foods seem to lower cholesterol and are generally good for your health. Examples include fruits, vegetables, beans, and oats. Some doctors even recommend taking fiber supplements.  What about  foods that claim to lower cholesterol?   Be careful with these foods. There are now many foods that have added plant extracts called \"sterols\" or \"stanols.\" Examples include special margarines such as Benecol. Foods with added sterols or stanols can lower cholesterol. But it's not clear whether they help lower the risk of heart attack or stroke, or if they are safe to use long-term. Plus, research in animals shows that these extracts might actually cause health problems. Experts think more research is needed before they can recommend that people eat these foods.  Should I take supplements to lower my cholesterol?   Maybe. Some research has shown that certain supplements can lower cholesterol. But there is almost no research showing that supplements can help prevent heart attacks, strokes, or any of the problems caused by high cholesterol. If you decide to try supplements, keep in mind that in the [place], the government does not regulate supplements very well. That means that what's on a supplement's label is not always actually in the " "bottle.  Here are some supplements that might help with cholesterol:  ?Red yeast rice - Red yeast rice helps lower cholesterol. It might contain monacolin K, which is the same ingredient that is in a prescription medicine to lower cholesterol. But the supplements that you can buy might not always have much monacolin K. If you are interested in taking red yeast rice, talk to your doctor to see if the prescription medicine is a better choice.  ?Omega-3 fatty acid supplements - Some omega-3 fatty acid supplements might help lower cholesterol, but they might also raise it.  What supplements don't work?   There is no good evidence that calcium, garlic, coconut oil, coconut water, resveratrol, policosanol, or soy isoflavone supplements lower cholesterol.     Below are some Heart Health Tips that we provide to all of our patients. I hope you find them useful.       -- BLOOD WORK   ++ Please note that [facility] is currently utilizing [facility] for most outpatient lab tests. Because of this, labs can be obtained from any [facility] location. It is important that the labs be obtained at least 3 DAYS before your appointment to allow the results to be available in order to discuss them at your appointment.     --- Scheduling Additional Tests  ++ For radiology scheduling (Cardiac calcium score, CTA with HeartFlow, Cardiac MRI, NM cardiac stress test, PET viability study) the phone number is [phone]       - If you are having problems with medications, consider looking at the following websites.   --  \"GoodRx\"   --  \"[facility]\"      - We are happy to supply written prescriptions if needed to allow you to obtain your medications from different pharmacies. Additionally, if you are having issues with mail order delivery, please let us know. We can send a limited supply of your medications to your local pharmacy.     -  We recommend you follow a heart healthy diet. Watch food labels and try " not to eat more than 2,500 mg of sodium per day. Avoid foods high in salt like processed meats (lunch meats, armstrong, and sausage), processed foods (boxed dinners, canned soups), fried and fast foods. Monitor serving sizes and if the sodium per serving size is more than 200 mg, avoid those foods. If the sodium per serving size is between 100-200 mg, you can use those in limited quantities. Try to choose foods where the amount of sodium per serving size is less than 100 mg. Try to eat a diet rich in fruits and vegetables, whole grains, low fat dairy products, skinless poultry and fish, nuts, beans, non-tropical vegetable oils. Limit saturated fat, trans fat, sodium, red meats, and sugar-sweetened beverages.   Limit alcohol     -The combination of a reduced-calorie diet and increased physical activity is recommended. Adults should aim to get at least 150 minutes of moderate physical activity per week (30 minutes of moderate physical activities at least 5 days per week). Examples of moderate physical activities include brisk walking, swimming, aerobic dancing, heavy gardening, jumping rope, bicycling 10 MPH or faster, tennis, hiking uphill or with a heavy backpack. Please let us know if you would like to learn more about your nutrition and calories and additional options including weight loss programs to help you reach your goal.     -If you smoke, stop smoking. If you stop smoking you can help get rid of a major source of stress to your heart. Smoking makes your heart rate and blood pressure go up and increases your risk or developing cardiovascular diseases and worsen symptoms associated with heart failure.     -Obtain a BP monitor and monitor your BP daily. Check it around the same time each day; at least 1 hour after taking your medications. Record your BP in a log and bring your log with you to your doctors appointment.     -F/u with your PCP as recommended.

## 2025-06-19 NOTE — ASSESSMENT & PLAN NOTE
10 % 10-year ASCVD risk.  She was started on simvastatin which may be troublesome given its multiple medication as well as inability to titrate further.  --Stop simvastatin  --Begin rosuvastatin 20 mg for better lipid profile  --Diet, exercise, lifestyle modification also discussed and included

## 2025-06-19 NOTE — ASSESSMENT & PLAN NOTE
Additional information on home management of BPPV provided including links YouTube videos showing performance.  --Continue to follow with ENT/as needed

## 2025-07-11 ENCOUNTER — APPOINTMENT (OUTPATIENT)
Dept: SLEEP MEDICINE | Facility: CLINIC | Age: 70
End: 2025-07-11
Payer: MEDICARE

## 2025-07-11 ENCOUNTER — HOSPITAL ENCOUNTER (OUTPATIENT)
Dept: RADIOLOGY | Facility: HOSPITAL | Age: 70
Discharge: HOME | End: 2025-07-11
Payer: MEDICARE

## 2025-07-11 ENCOUNTER — OFFICE VISIT (OUTPATIENT)
Dept: ORTHOPEDIC SURGERY | Facility: HOSPITAL | Age: 70
End: 2025-07-11
Payer: MEDICARE

## 2025-07-11 VITALS — BODY MASS INDEX: 26.46 KG/M2 | HEIGHT: 64 IN | WEIGHT: 155 LBS

## 2025-07-11 DIAGNOSIS — S92.334A NONDISPLACED FRACTURE OF THIRD METATARSAL BONE, RIGHT FOOT, INITIAL ENCOUNTER FOR CLOSED FRACTURE: Primary | ICD-10-CM

## 2025-07-11 DIAGNOSIS — S92.909A CLOSED FRACTURE OF FOOT, UNSPECIFIED LATERALITY, INITIAL ENCOUNTER: ICD-10-CM

## 2025-07-11 DIAGNOSIS — S92.344A NONDISPLACED FRACTURE OF FOURTH METATARSAL BONE, RIGHT FOOT, INITIAL ENCOUNTER FOR CLOSED FRACTURE: ICD-10-CM

## 2025-07-11 PROCEDURE — 99203 OFFICE O/P NEW LOW 30 MIN: CPT | Performed by: PHYSICIAN ASSISTANT

## 2025-07-11 PROCEDURE — 73630 X-RAY EXAM OF FOOT: CPT | Mod: RT

## 2025-07-11 PROCEDURE — 1159F MED LIST DOCD IN RCRD: CPT | Performed by: PHYSICIAN ASSISTANT

## 2025-07-11 PROCEDURE — 3008F BODY MASS INDEX DOCD: CPT | Performed by: PHYSICIAN ASSISTANT

## 2025-07-11 PROCEDURE — 99213 OFFICE O/P EST LOW 20 MIN: CPT | Performed by: PHYSICIAN ASSISTANT

## 2025-07-11 ASSESSMENT — PAIN - FUNCTIONAL ASSESSMENT: PAIN_FUNCTIONAL_ASSESSMENT: 0-10

## 2025-07-11 ASSESSMENT — PAIN SCALES - GENERAL: PAINLEVEL_OUTOF10: 0 - NO PAIN

## 2025-07-11 NOTE — PROGRESS NOTES
"Subjective    Patient ID: Emmanuel Israel \"Kia" is a 70 y.o. female.    Chief Complaint: Right foot pain         HPI:  Emmanuel Israel \"Kia" is a 70 y.o. female who presents to orthopedic walk-in clinic for complaint of right foot pain.  She was in [facility] visiting family on 7/1/2025 when she missed a step.  She twisted her right foot and noted immediate sharp pain.  She was evaluated in urgent care there.  X-rays were concerning for fractures in her foot.  She was placed in a tall cam walker boot.  Today she states she is able to ambulate but still reports some mild pain.  She locates pain to be from the middle portion of her foot down towards her toes.  She states she has had some bruising.    ROS  Constitutional: No fever, no chills, not feeling tired, no recent weight gain and no recent weight loss  ENT: No nosebleeds  Cardiovascular: No chest pain  Respiratory: No shortness of breath and no cough  Gastrointestinal: No abdominal pain, no nausea, no diarrhea, and no vomiting  Musculoskeletal: No arthralgias  Integumentary: No rashes and no skin lesions  Neurological: No headache  Psychiatric: No sleep disturbances no depression  Endocrine: No muscle weakness and no muscle cramps  Hematologic/lymphatic: No swelling glands and no tendency for easy bruising    Medical History[1]     Surgical History[2]     Current Medications[3]     RX Allergies[4]     Social Connections: Not on file          Objective   70-year-old female well appearing in no acute distress. Alert and oriented ×3.  Skin intact bilateral lower extremities.   Slightly antalgic gait. Coordination and balance intact.  Bilateral lower extremity compartments supple.  5 out of 5 distal motor strength bilaterally.  L4 through S1 sensation intact bilaterally.  2+ DP/PT pulses bilaterally.  Right foot with hallux valgus deformity at the first MTP joint.  Minimal swelling.  Mild ecchymosis on the plantar aspect of her foot.  No tenderness " along the course of the first metatarsal over the Lisfranc joint.  Mild tenderness over the 3rd and 4th proximal metatarsals as well as distally.    Image Results:  X-rays of the right foot taken today in the office were reviewed.  These demonstrated nondisplaced fractures of the base of the 3rd and 4th metatarsals.      Assessment/Plan   Encounter Diagnoses:  Nondisplaced fracture of third metatarsal bone, right foot, initial encounter for closed fracture    Closed fracture of foot, unspecified laterality, initial encounter    Nondisplaced fracture of fourth metatarsal bone, right foot, initial encounter for closed fracture    Orders Placed This Encounter    XR foot right 3+ views       She is going to continue with the cam walker boot today.  She should try to keep her foot and ankle up and elevate is much as possible to help reduce swelling.  She can take it off at night when trying to sleep.  We will have her follow-up with Dr. Goldsmith in 2 weeks.    This office note was dictated using Dragon voice to text software and was not proofread for spelling or grammatical errors       [1] No past medical history on file.  [2]   Past Surgical History:  Procedure Laterality Date    BACK SURGERY  02/25/2013    Back Surgery    BREAST RECONSTRUCTION Bilateral 02/25/2013    Breast Surgery Reduction Procedure    CATARACT EXTRACTION Left 05/08/2025    OTHER SURGICAL HISTORY  04/01/2021    Exploratory laparoscopy    OTHER SURGICAL HISTORY  04/01/2021    Hip replacement   [3]   Current Outpatient Medications:     citalopram (CeleXA) 20 mg tablet, Take 1 tablet (20 mg) by mouth once daily., Disp: 90 tablet, Rfl: 3    hydrOXYzine HCL (Atarax) 25 mg tablet, Take 1 tablet (25 mg) by mouth once daily at bedtime., Disp: 90 tablet, Rfl: 3    losartan-hydrochlorothiazide (Hyzaar) 100-25 mg tablet, Take 1 tablet by mouth once daily., Disp: 90 tablet, Rfl: 3    omeprazole (PriLOSEC) 20 mg tablet,delayed release (DR/EC) EC tablet, Take 1  tablet (20 mg) by mouth once daily., Disp: 90 tablet, Rfl: 3    ondansetron ODT (Zofran-ODT) 4 mg disintegrating tablet, Dissolve 1 tablet (4 mg) in the mouth every 8 hours if needed for nausea or vomiting. (Patient not taking: Reported on 6/19/2025), Disp: 30 tablet, Rfl: 0    rosuvastatin (Crestor) 20 mg tablet, Take 1 tablet (20 mg) by mouth once daily., Disp: 90 tablet, Rfl: 3    tretinoin (Retin-A) 0.05 % cream, Apply topically to clean dry face on MWF. Increase frequency as tolerated., Disp: 45 g, Rfl: 2  [4]   Allergies  Allergen Reactions    Latex Unknown and Rash    Penicillins Unknown and Rash

## 2025-07-23 ENCOUNTER — OFFICE VISIT (OUTPATIENT)
Dept: ORTHOPEDIC SURGERY | Facility: CLINIC | Age: 70
End: 2025-07-23
Payer: MEDICARE

## 2025-07-23 DIAGNOSIS — S92.334A NONDISPLACED FRACTURE OF THIRD METATARSAL BONE, RIGHT FOOT, INITIAL ENCOUNTER FOR CLOSED FRACTURE: Primary | ICD-10-CM

## 2025-07-23 DIAGNOSIS — S92.344A NONDISPLACED FRACTURE OF FOURTH METATARSAL BONE, RIGHT FOOT, INITIAL ENCOUNTER FOR CLOSED FRACTURE: ICD-10-CM

## 2025-07-23 PROCEDURE — 28470 CLTX METATARSAL FX WO MNP EA: CPT | Performed by: ORTHOPAEDIC SURGERY

## 2025-07-23 PROCEDURE — 1159F MED LIST DOCD IN RCRD: CPT | Performed by: ORTHOPAEDIC SURGERY

## 2025-07-23 PROCEDURE — 99213 OFFICE O/P EST LOW 20 MIN: CPT | Mod: 25 | Performed by: ORTHOPAEDIC SURGERY

## 2025-07-23 PROCEDURE — 99213 OFFICE O/P EST LOW 20 MIN: CPT | Performed by: ORTHOPAEDIC SURGERY

## 2025-07-23 ASSESSMENT — PAIN - FUNCTIONAL ASSESSMENT: PAIN_FUNCTIONAL_ASSESSMENT: NO/DENIES PAIN

## 2025-07-23 NOTE — PROGRESS NOTES
Chicoaimee AMBROCIO Israel    CHIEF COMPLAINT:  R 3rd and 4th MT base fractures    HISTORY OF PRESENT ILLNESS:  This is a 70 y.o. female who presents today with right foot pain. Was walking in [facility] approximately 3 weeks ago when she missed a step. She was seen in the ED and was given a CAM boot.    Occupation: retired  Nicotine (Smoking/Vaping) History: non-smoker  Personal or Family Hx of DVT/PE: No  Metal Allergy: No  Diabetic:   No  Last Hgba1c:   Lab Results   Component Value Date    HGBA1C 5.3 02/14/2024       Assessment/Plan:  70-year-old female with right 3rd and 4th metatarsal base fractures.  We discussed that these are amenable to nonoperative treatment.  Would recommend weightbearing as tolerated in the tall cam boot.      Follow up in 3 weeks, with standing R foot films upon return.    Thank you for the opportunity to participate in this patient's care.    Physical Exam:  Well appearing female in no acute distress; Alert and oriented.  Left Lower Extremity:   Grossly intact ROM and strength, no obvious deformity.  Right  Lower Extremity:  Inspection:  Swelling: Yes Redness: No  Ecchymosis: No Effusion: No    Alignment: no angular deformity  Pain on palpation:  midfoot, metatarsal bases  ROM: normal  Strength: normal  Stability:  is stable  Neurologic Status:  Sensation to all 4 compartments of lower extremity are grossly intact to light touch today in the office.  Vascular Status:   Tibialis posterior pulse: present  Dorsalis pedis pulse: present  Skin:  Normal      IMAGING:     Prior imaging results dated 7/11/25 were reviewed. Final results and radiologist's interpretation, available in the [facility] health record. Images were reviewed with the patient/family members in the office today. My personal interpretation of the performed imaging is right nondisplaced third and fourth metatarsal base fractures    Kedar Elizabeth MD PGY-3      Orthopaedic Attending Addendum    I saw and evaluated the patient. I  personally obtained the key and critical portions of the history and physical exam or was physically present for key and critical portions performed by the resident/fellow. I reviewed the resident/fellow's documentation and discussed the patient with the resident/fellow. I updated and corrected the note as necessary.    My personal assessment and plan are as follows:    1. Nondisplaced fracture of third metatarsal bone, right foot, initial encounter for closed fracture (Primary)  Right foot x-rays ordered and reviewed  - XR foot right 3+ views; Future    2. Nondisplaced fracture of fourth metatarsal bone, right foot, initial encounter for closed fracture  - XR foot right 3+ views; Future      Discussed she has non placed fractures of the 3rd and 4th metatarsal bases.  I do think we can treat this nonoperatively.  Weightbearing as tolerated in a boot.  Follow-up in approximately 4 weeks with right foot films    Raegan John MD

## 2025-08-13 ENCOUNTER — HOSPITAL ENCOUNTER (OUTPATIENT)
Dept: RADIOLOGY | Facility: CLINIC | Age: 70
Discharge: HOME | End: 2025-08-13
Payer: MEDICARE

## 2025-08-13 ENCOUNTER — OFFICE VISIT (OUTPATIENT)
Dept: ORTHOPEDIC SURGERY | Facility: CLINIC | Age: 70
End: 2025-08-13
Payer: MEDICARE

## 2025-08-13 DIAGNOSIS — S92.344A NONDISPLACED FRACTURE OF FOURTH METATARSAL BONE, RIGHT FOOT, INITIAL ENCOUNTER FOR CLOSED FRACTURE: ICD-10-CM

## 2025-08-13 DIAGNOSIS — S92.334A NONDISPLACED FRACTURE OF THIRD METATARSAL BONE, RIGHT FOOT, INITIAL ENCOUNTER FOR CLOSED FRACTURE: Primary | ICD-10-CM

## 2025-08-13 DIAGNOSIS — S92.334A NONDISPLACED FRACTURE OF THIRD METATARSAL BONE, RIGHT FOOT, INITIAL ENCOUNTER FOR CLOSED FRACTURE: ICD-10-CM

## 2025-08-13 PROCEDURE — 73630 X-RAY EXAM OF FOOT: CPT | Mod: RIGHT SIDE | Performed by: RADIOLOGY

## 2025-08-13 PROCEDURE — 73630 X-RAY EXAM OF FOOT: CPT | Mod: RT

## 2025-08-13 PROCEDURE — 1159F MED LIST DOCD IN RCRD: CPT | Performed by: ORTHOPAEDIC SURGERY

## 2025-08-13 PROCEDURE — 1036F TOBACCO NON-USER: CPT | Performed by: ORTHOPAEDIC SURGERY

## 2025-08-13 PROCEDURE — 99024 POSTOP FOLLOW-UP VISIT: CPT | Performed by: ORTHOPAEDIC SURGERY

## 2025-08-13 PROCEDURE — 99213 OFFICE O/P EST LOW 20 MIN: CPT | Performed by: ORTHOPAEDIC SURGERY

## 2025-08-13 ASSESSMENT — PAIN - FUNCTIONAL ASSESSMENT: PAIN_FUNCTIONAL_ASSESSMENT: NO/DENIES PAIN

## 2025-08-18 ENCOUNTER — EVALUATION (OUTPATIENT)
Dept: PHYSICAL THERAPY | Facility: CLINIC | Age: 70
End: 2025-08-18
Payer: MEDICARE

## 2025-08-18 DIAGNOSIS — S92.334A NONDISPLACED FRACTURE OF THIRD METATARSAL BONE, RIGHT FOOT, INITIAL ENCOUNTER FOR CLOSED FRACTURE: Primary | ICD-10-CM

## 2025-08-18 DIAGNOSIS — S92.344A NONDISPLACED FRACTURE OF FOURTH METATARSAL BONE, RIGHT FOOT, INITIAL ENCOUNTER FOR CLOSED FRACTURE: ICD-10-CM

## 2025-08-18 PROCEDURE — 97161 PT EVAL LOW COMPLEX 20 MIN: CPT | Mod: GP | Performed by: PHYSICAL THERAPIST

## 2025-08-26 ENCOUNTER — TREATMENT (OUTPATIENT)
Dept: PHYSICAL THERAPY | Facility: CLINIC | Age: 70
End: 2025-08-26
Payer: MEDICARE

## 2025-08-26 DIAGNOSIS — S92.334A NONDISPLACED FRACTURE OF THIRD METATARSAL BONE, RIGHT FOOT, INITIAL ENCOUNTER FOR CLOSED FRACTURE: Primary | ICD-10-CM

## 2025-08-26 DIAGNOSIS — S92.344A NONDISPLACED FRACTURE OF FOURTH METATARSAL BONE, RIGHT FOOT, INITIAL ENCOUNTER FOR CLOSED FRACTURE: ICD-10-CM

## 2025-08-26 PROCEDURE — 97110 THERAPEUTIC EXERCISES: CPT | Mod: GP,CQ

## 2025-09-02 ENCOUNTER — TREATMENT (OUTPATIENT)
Dept: PHYSICAL THERAPY | Facility: CLINIC | Age: 70
End: 2025-09-02
Payer: MEDICARE

## 2025-09-02 DIAGNOSIS — S92.334A NONDISPLACED FRACTURE OF THIRD METATARSAL BONE, RIGHT FOOT, INITIAL ENCOUNTER FOR CLOSED FRACTURE: Primary | ICD-10-CM

## 2025-09-02 DIAGNOSIS — S92.344A NONDISPLACED FRACTURE OF FOURTH METATARSAL BONE, RIGHT FOOT, INITIAL ENCOUNTER FOR CLOSED FRACTURE: ICD-10-CM

## 2025-09-02 PROCEDURE — 97110 THERAPEUTIC EXERCISES: CPT | Mod: GP,CQ

## 2025-09-05 ENCOUNTER — HOSPITAL ENCOUNTER (OUTPATIENT)
Dept: RADIOLOGY | Facility: HOSPITAL | Age: 70
Discharge: HOME | End: 2025-09-05
Payer: MEDICARE

## 2025-09-05 DIAGNOSIS — R91.1 LUNG NODULE: ICD-10-CM

## 2025-09-05 PROCEDURE — 71250 CT THORAX DX C-: CPT

## 2025-09-23 ENCOUNTER — APPOINTMENT (OUTPATIENT)
Dept: PULMONOLOGY | Facility: CLINIC | Age: 70
End: 2025-09-23
Payer: MEDICARE